# Patient Record
Sex: MALE | Race: WHITE | Employment: OTHER | URBAN - METROPOLITAN AREA
[De-identification: names, ages, dates, MRNs, and addresses within clinical notes are randomized per-mention and may not be internally consistent; named-entity substitution may affect disease eponyms.]

---

## 2017-01-29 ENCOUNTER — APPOINTMENT (OUTPATIENT)
Dept: CT IMAGING | Age: 82
End: 2017-01-29
Attending: EMERGENCY MEDICINE
Payer: MEDICARE

## 2017-01-29 ENCOUNTER — HOSPITAL ENCOUNTER (EMERGENCY)
Age: 82
Discharge: HOME | End: 2017-01-29
Attending: EMERGENCY MEDICINE
Payer: MEDICARE

## 2017-01-29 VITALS
HEART RATE: 99 BPM | WEIGHT: 177.69 LBS | SYSTOLIC BLOOD PRESSURE: 140 MMHG | BODY MASS INDEX: 25.44 KG/M2 | TEMPERATURE: 98.2 F | OXYGEN SATURATION: 95 % | HEIGHT: 70 IN | RESPIRATION RATE: 17 BRPM | DIASTOLIC BLOOD PRESSURE: 88 MMHG

## 2017-01-29 DIAGNOSIS — R51.9 NONINTRACTABLE EPISODIC HEADACHE, UNSPECIFIED HEADACHE TYPE: Primary | ICD-10-CM

## 2017-01-29 PROCEDURE — 99282 EMERGENCY DEPT VISIT SF MDM: CPT

## 2017-01-29 PROCEDURE — 70450 CT HEAD/BRAIN W/O DYE: CPT

## 2017-01-29 RX ORDER — BUTALBITAL, ACETAMINOPHEN AND CAFFEINE 300; 40; 50 MG/1; MG/1; MG/1
1 CAPSULE ORAL
Qty: 20 CAP | Refills: 0 | Status: SHIPPED | OUTPATIENT
Start: 2017-01-29 | End: 2019-03-19

## 2017-01-29 NOTE — ED TRIAGE NOTES
Patient presents ambulatory to treatment area with a steady gait. 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 He was told that the tumor has grown at the time of his last MRI. Patient had no symptoms at the time and did not wish to have the tumor removed at that time. Last night, he developed a headache that is focused in the anterior area of his head. The pain persists today.

## 2017-01-29 NOTE — DISCHARGE INSTRUCTIONS
Headache: Care Instructions  Your Care Instructions    Headaches have many possible causes. Most headaches aren't a sign of a more serious problem, and they will get better on their own. Home treatment may help you feel better faster. The doctor has checked you carefully, but problems can develop later. If you notice any problems or new symptoms, get medical treatment right away. Follow-up care is a key part of your treatment and safety. Be sure to make and go to all appointments, and call your doctor if you are having problems. It's also a good idea to know your test results and keep a list of the medicines you take. How can you care for yourself at home? · Do not drive if you have taken a prescription pain medicine. · Rest in a quiet, dark room until your headache is gone. Close your eyes and try to relax or go to sleep. Don't watch TV or read. · Put a cold, moist cloth or cold pack on the painful area for 10 to 20 minutes at a time. Put a thin cloth between the cold pack and your skin. · Use a warm, moist towel or a heating pad set on low to relax tight shoulder and neck muscles. · Have someone gently massage your neck and shoulders. · Take pain medicines exactly as directed. ¨ If the doctor gave you a prescription medicine for pain, take it as prescribed. ¨ If you are not taking a prescription pain medicine, ask your doctor if you can take an over-the-counter medicine. · Be careful not to take pain medicine more often than the instructions allow, because you may get worse or more frequent headaches when the medicine wears off. · Do not ignore new symptoms that occur with a headache, such as a fever, weakness or numbness, vision changes, or confusion. These may be signs of a more serious problem. To prevent headaches  · Keep a headache diary so you can figure out what triggers your headaches. Avoiding triggers may help you prevent headaches.  Record when each headache began, how long it lasted, and what the pain was like (throbbing, aching, stabbing, or dull). Write down any other symptoms you had with the headache, such as nausea, flashing lights or dark spots, or sensitivity to bright light or loud noise. Note if the headache occurred near your period. List anything that might have triggered the headache, such as certain foods (chocolate, cheese, wine) or odors, smoke, bright light, stress, or lack of sleep. · Find healthy ways to deal with stress. Headaches are most common during or right after stressful times. Take time to relax before and after you do something that has caused a headache in the past.  · Try to keep your muscles relaxed by keeping good posture. Check your jaw, face, neck, and shoulder muscles for tension, and try relaxing them. When sitting at a desk, change positions often, and stretch for 30 seconds each hour. · Get plenty of sleep and exercise. · Eat regularly and well. Long periods without food can trigger a headache. · Treat yourself to a massage. Some people find that regular massages are very helpful in relieving tension. · Limit caffeine by not drinking too much coffee, tea, or soda. But don't quit caffeine suddenly, because that can also give you headaches. · Reduce eyestrain from computers by blinking frequently and looking away from the computer screen every so often. Make sure you have proper eyewear and that your monitor is set up properly, about an arm's length away. · Seek help if you have depression or anxiety. Your headaches may be linked to these conditions. Treatment can both prevent headaches and help with symptoms of anxiety or depression. When should you call for help? Call 911 anytime you think you may need emergency care. For example, call if:  · You have signs of a stroke. These may include:  ¨ Sudden numbness, paralysis, or weakness in your face, arm, or leg, especially on only one side of your body. ¨ Sudden vision changes.   ¨ Sudden trouble speaking. ¨ Sudden confusion or trouble understanding simple statements. ¨ Sudden problems with walking or balance. ¨ A sudden, severe headache that is different from past headaches. Call your doctor now or seek immediate medical care if:  · You have a new or worse headache. · Your headache gets much worse. Where can you learn more? Go to http://tenzin-henrry.info/. Enter M271 in the search box to learn more about \"Headache: Care Instructions. \"  Current as of: February 19, 2016  Content Version: 11.1  © 1777-2246 [employer]. Care instructions adapted under license by [employer] (which disclaims liability or warranty for this information). If you have questions about a medical condition or this instruction, always ask your healthcare professional. [address] any warranty or liability for your use of this information. We hope that we have addressed all of your medical concerns. The examination and treatment you received in the Emergency Department were for an emergent problem and were not intended as complete care. It is important that you follow up with your healthcare provider(s) for ongoing care. If your symptoms worsen or do not improve as expected, and you are unable to reach your usual health care provider(s), you should return to the Emergency Department. Today's healthcare is undergoing tremendous change, and patient satisfaction surveys are one of the many tools to assess the quality of medical care. You may receive a survey from the [employer] regarding your experience in the Emergency Department. I hope that your experience has been completely positive, particularly the medical care that I provided. As such, please participate in the survey; anything less than excellent does not meet my expectations or intentions.         [address] [facility] and [address] [place] [address] participate in nationally recognized quality of care measures. If your blood pressure is greater than 120/80, as reported below, we urge that you seek medical care to address the potential of high blood pressure, commonly known as hypertension. Hypertension can be hereditary or can be caused by certain medical conditions, pain, stress, or \"white coat syndrome. \"       Please make an appointment with your health care provider(s) for follow up of your Emergency Department visit. VITALS:   Patient Vitals for the past 8 hrs:   Temp Pulse Resp BP SpO2   01/29/17 1256 98.2 °F (36.8 °C) 99 17 140/88 95 %          Thank you for allowing us to provide you with medical care today. We realize that you have many choices for your emergency care needs. Please choose us in the future for any continued health care needs. Regards,           Manfred Cornejo, [address]: [phone]            No results found for this or any previous visit (from the past 24 hour(s)). Ct Head Wo Cont    Result Date: 1/29/2017  EXAM:  CT head without contrast INDICATION: Headache. History of meningioma. COMPARISON: MRI brain 10/5/2016. CT head 2/22/2016. TECHNIQUE: Axial noncontrast head CT from foramen magnum to vertex. Coronal and sagittal reformatted images were obtained. CT dose reduction was achieved through use of a standardized protocol tailored for this examination and automatic exposure control for dose modulation. Adaptive statistical iterative reconstruction (ASIR) was utilized. FINDINGS:  There is diffuse age-related parenchymal volume loss. The ventricles and sulci are age-appropriate without hydrocephalus. There is no intracranial hemorrhage or extra-axial fluid collection. There is a stable confluent area of low attenuation in the right frontal lobe white matter surrounding a known meningioma. There is stable mild mass effect in the left frontal lobe.  Additional scattered foci of low attenuation in the periventricular white matter represent stable chronic microvascular ischemic changes. There is no new abnormal parenchymal attenuation. The basal cisterns are patent. There is intracranial atherosclerosis. The osseous structures are intact. The visualized paranasal sinuses and mastoid air cells are clear. IMPRESSION: No acute intracranial abnormality. Grossly stable right frontal meningioma with surrounding vasogenic edema.

## 2017-01-29 NOTE — ED NOTES
The patient was discharged home by Dr. Clarence Martin in stable condition. The patient is alert and oriented, in no respiratory distress. The patient's diagnosis, condition and treatment were explained. The patient expressed understanding. Two prescriptions given. No work/school note given. A discharge plan has been developed. A  was not involved in the process. Aftercare instructions were given. Pt ambulatory out of the ED with spouse.

## 2017-01-29 NOTE — ED PROVIDER NOTES
Patient is a 80 y.o. male presenting with headaches. The history is provided by the patient. Headache    This is a new problem. The current episode started yesterday. The problem occurs constantly. The problem has not changed since onset. The headache is aggravated by nothing (recent uri symptoms). The pain is located in the frontal region. The quality of the pain is described as dull. Pertinent negatives include no fever, no palpitations, no shortness of breath, no weakness, no dizziness, no nausea and no vomiting. He has tried nothing for the symptoms. Past Medical History:   Diagnosis Date    Brain tumor ([address]      frontal; behind right eye. 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 at [facility] MAIN OR    Hx gi       inguinal hernia repairx 2    Pr abdomen surgery proc unlisted       hernia    Hx heent       nasal    Hx colonoscopy  2009         Family History:   Problem Relation Age of Onset    No Known Problems Mother     No Known Problems Father        Social History     Social History    Marital status:      Spouse name: N/A    Number of children: N/A    Years of education: N/A     Occupational History    Not on file.      Social History Main Topics    Smoking status: Former Smoker     Packs/day: 1.00     Years: 4.00     Quit date: 1/20/1960    Smokeless tobacco: Never Used    Alcohol use Yes      Comment: occasional/social    Drug use: No    Sexual activity: Not on file     Other Topics Concern    Not on file     Social History Narrative         ALLERGIES: Review of patient's allergies indicates no known allergies. Review of Systems   Constitutional: Negative. Negative for activity change, appetite change, chills, fatigue, fever and unexpected weight change. HENT: Negative for congestion, hearing loss, rhinorrhea, sneezing and voice change. Eyes: Negative. Negative for pain and visual disturbance. Respiratory: Negative. Negative for apnea, cough, choking, chest tightness and shortness of breath. Cardiovascular: Negative. Negative for chest pain and palpitations. Gastrointestinal: Negative. Negative for abdominal distention, abdominal pain, blood in stool, diarrhea, nausea and vomiting. Genitourinary: Negative. Negative for difficulty urinating, flank pain, frequency and urgency. No discharge   Musculoskeletal: Negative. Negative for arthralgias, back pain, myalgias and neck stiffness. Skin: Negative. Negative for color change and rash. Neurological: Positive for headaches. Negative for dizziness, seizures, syncope, speech difficulty, weakness and numbness. Hematological: Negative for adenopathy. Psychiatric/Behavioral: Negative. Negative for agitation, behavioral problems, dysphoric mood and suicidal ideas. The patient is not nervous/anxious. All other systems reviewed and are negative. Vitals:    01/29/17 1256   BP: 140/88   Pulse: 99   Resp: 17   Temp: 98.2 °F (36.8 °C)   SpO2: 95%   Weight: 80.6 kg (177 lb 11.1 oz)   Height: 5' 10\" (1.778 m)            Physical Exam   Constitutional: He is oriented to person, place, and time. He appears well-developed and well-nourished. No distress. HENT:   Head: Normocephalic and atraumatic. Mouth/Throat: Oropharynx is clear and moist. No oropharyngeal exudate. Eyes: Conjunctivae and EOM are normal. Pupils are equal, round, and reactive to light. Right eye exhibits no discharge. Left eye exhibits no discharge. Neck: Normal range of motion. Neck supple.    Cardiovascular: Normal rate, regular rhythm and intact distal pulses. Exam reveals no gallop and no friction rub. No murmur heard. Pulmonary/Chest: Effort normal and breath sounds normal. No respiratory distress. He has no wheezes. He has no rales. He exhibits no tenderness. Abdominal: Soft. Bowel sounds are normal. He exhibits no distension and no mass. There is no tenderness. There is no rebound and no guarding. Musculoskeletal: Normal range of motion. He exhibits no edema. Lymphadenopathy:     He has no cervical adenopathy. Neurological: He is alert and oriented to person, place, and time. No cranial nerve deficit. Coordination normal.   Skin: Skin is warm and dry. No rash noted. No erythema. Psychiatric: He has a normal mood and affect. Nursing note and vitals reviewed. MDM  Number of Diagnoses or Management Options  Diagnosis management comments: Patient with a known meningioma that is being followed by Dr. Juan Miguel Burns (neurosurgery) - currently not under treatment. Acuity of symptoms clinically more likely to be from mild uri. Ct shows no significant change in prior findings. Will d/c with fioricet, coricidin hbp. F/u pcp this week.        Amount and/or Complexity of Data Reviewed  Tests in the radiology section of CPT®: ordered and reviewed    Risk of Complications, Morbidity, and/or Mortality  Presenting problems: moderate  Diagnostic procedures: moderate  Management options: low    Patient Progress  Patient progress: stable    ED Course       Procedures

## 2017-04-24 ENCOUNTER — HOSPITAL ENCOUNTER (OUTPATIENT)
Dept: RADIATION THERAPY | Age: 82
Discharge: HOME | End: 2017-04-24

## 2017-04-26 ENCOUNTER — HOSPITAL ENCOUNTER (OUTPATIENT)
Dept: MRI IMAGING | Age: 82
Discharge: HOME | End: 2017-04-26
Attending: RADIOLOGY
Payer: MEDICARE

## 2017-04-26 VITALS — BODY MASS INDEX: 24.68 KG/M2 | WEIGHT: 172 LBS

## 2017-04-26 DIAGNOSIS — D32.9 BENIGN NEOPLASM OF MENINGES (HCC): ICD-10-CM

## 2017-04-26 LAB — CREAT BLD-MCNC: 1.1 MG/DL (ref 0.6–1.3)

## 2017-04-26 PROCEDURE — A9585 GADOBUTROL INJECTION: HCPCS | Performed by: RADIOLOGY

## 2017-04-26 PROCEDURE — 74011250636 HC RX REV CODE- 250/636: Performed by: RADIOLOGY

## 2017-04-26 PROCEDURE — 82565 ASSAY OF CREATININE: CPT

## 2017-04-26 PROCEDURE — 70553 MRI BRAIN STEM W/O & W/DYE: CPT

## 2017-04-26 RX ADMIN — GADOBUTROL 7 ML: 604.72 INJECTION INTRAVENOUS at 12:04

## 2017-07-11 ENCOUNTER — HOSPITAL ENCOUNTER (OUTPATIENT)
Dept: RADIATION THERAPY | Age: 82
Discharge: HOME | End: 2017-07-11

## 2018-05-30 ENCOUNTER — HOSPITAL ENCOUNTER (OUTPATIENT)
Dept: LAB | Age: 83
Discharge: HOME | End: 2018-05-30

## 2018-05-30 ENCOUNTER — OFFICE VISIT (OUTPATIENT)
Dept: DERMATOLOGY | Facility: AMBULATORY SURGERY CENTER | Age: 83
End: 2018-05-30

## 2018-05-30 VITALS
BODY MASS INDEX: 23.62 KG/M2 | WEIGHT: 165 LBS | OXYGEN SATURATION: 96 % | DIASTOLIC BLOOD PRESSURE: 80 MMHG | SYSTOLIC BLOOD PRESSURE: 140 MMHG | TEMPERATURE: 98.5 F | HEART RATE: 74 BPM | RESPIRATION RATE: 20 BRPM | HEIGHT: 70 IN

## 2018-05-30 DIAGNOSIS — L82.0 INFLAMED SEBORRHEIC KERATOSIS: ICD-10-CM

## 2018-05-30 DIAGNOSIS — D48.5 NEOPLASM OF UNCERTAIN BEHAVIOR OF SKIN OF CHIN: ICD-10-CM

## 2018-05-30 DIAGNOSIS — L82.1 SEBORRHEIC KERATOSES: ICD-10-CM

## 2018-05-30 DIAGNOSIS — Z85.828 HISTORY OF NONMELANOMA SKIN CANCER: ICD-10-CM

## 2018-05-30 DIAGNOSIS — L57.0 ACTINIC KERATOSES: Primary | ICD-10-CM

## 2018-05-30 RX ORDER — FLUOROURACIL 50 MG/G
CREAM TOPICAL 2 TIMES DAILY
Qty: 40 G | Refills: 0 | Status: SHIPPED | OUTPATIENT
Start: 2018-05-30

## 2018-05-30 RX ORDER — FLUOROURACIL 5 MG/G
CREAM TOPICAL DAILY
COMMUNITY
End: 2018-05-30

## 2018-05-30 NOTE — MR AVS SNAPSHOT
[address] [facility] [address] [place] [address] [address] [facility] 
[phone] Patient: Stephania Vega MRN: [id number] DPI:[id number] Visit Information Date & Time Provider Department Dept. Phone Encounter #  
 5/30/2018  2:45 PM RIGO Curiel57 [phone] [id number] Your Appointments 5/30/2018  2:45 PM  
Any with RIGO Curiel [id number] ) Appt Note: est.pt concerned about spot on nose [facility] [address] [facility] [address]  
[facility] [address] [place] [id number] Upcoming Health Maintenance Date Due DTaP/Tdap/Td series (1 - Tdap) 9/27/1952 ZOSTER VACCINE AGE 60> 7/27/1991 GLAUCOMA SCREENING Q2Y 9/27/1996 Pneumococcal 65+ High/Highest Risk (2 of 2 - PPSV23) 10/1/2012 MEDICARE YEARLY EXAM 3/14/2018 Influenza Age 5 to Adult 8/1/2018 Allergies as of 5/30/2018  Review Complete On: 5/30/2018 By: Brad Joyce No Known Allergies Current Immunizations  Reviewed on 10/11/2010 Name Date Influenza Vaccine Split 10/12/2010  2:21 PM  
 ZZZ-RETIRED (DO NOT USE) Pneumococcal Vaccine (Unspecified Type) 10/1/2007 Not reviewed this visit Vitals BP Pulse Temp Resp Height(growth percentile) Weight(growth percentile) 140/80 (BP 1 Location: Right arm, BP Patient Position: Sitting) 74 98.5 °F (36.9 °C) (Oral) 20 5' 10\" (1.778 m) 165 lb (74.8 kg) SpO2 BMI Smoking Status 96% 23.68 kg/m2 Former Smoker Vitals History BMI and BSA Data Body Mass Index Body Surface Area  
 23.68 kg/m 2 1.92 m 2 Preferred Pharmacy Pharmacy Name Phone [address] [facility] [address] [place] Your Updated Medication List  
  
   
 This list is accurate as of 5/30/18  1:07 PM.  Always use your most recent med list.  
  
  
  
  
 albuterol 90 mcg/actuation inhaler Commonly known as:  PROVENTIL HFA, VENTOLIN HFA, PROAIR HFA Take 2 Puffs by inhalation every four (4) hours as needed for Wheezing. Use with Spacer  
  
 aspirin 81 mg tablet Take 81 mg by mouth daily. Stopped 01/20/11 BREO ELLIPTA IN Take 1 Puff by inhalation. butalbital-acetaminophen-caff -40 mg per capsule Commonly known as:  [employer] Take 1 Cap by mouth every four (4) hours as needed for Pain. Max Daily Amount: 6 Caps. dextromethorphan-guaiFENesin  mg Cap Commonly known as:  CORICIDIN HBP Take 1 Cap by mouth two (2) times daily as needed. fluoruracil 0.5 % topical cream  
Commonly known as:  Maryann Amass Apply  to affected area daily. Apply to lesions as directed  
  
 fluticasone 50 mcg/actuation nasal spray Commonly known as:  Allan Calk 2 Sprays by Both Nostrils route daily. garlic 9,715 mg Cap Take 1,000 mg by mouth daily. inhalational spacing device 1 Each by Does Not Apply route as needed. lisinopril 40 mg tablet Commonly known as:  Fara Siva Take 40 mg by mouth daily. lovastatin 20 mg tablet Commonly known as:  MEVACOR Take 20 mg by mouth nightly. multivitamin tablet Commonly known as:  ONE A DAY Take 1 Tab by mouth daily. POTASSIUM CHLORIDE  
by Does Not Apply route. pravastatin 40 mg tablet Commonly known as:  PRAVACHOL Take 40 mg by mouth nightly. triamterene-hydroCHLOROthiazide 37.5-25 mg per capsule Commonly known as:  Paulo Ditch Take 1 Cap by mouth every morning. VITAMIN D3 1,000 unit tablet Generic drug:  cholecalciferol Take 1,000 Units by mouth daily. Patient Instructions Self Skin Exam and Sunscreens Early detection and treatment is essential in the treatment of all forms of skin cancer. If caught early, all forms of skin cancer are curable. In addition to your regular visits, you should perform a monthly skin examination. Over time, you become familiar with what is normally found on your skin and can identify new or suspicious spots. One of the screening tools you can use to assess your skin is to follow the ABCDEs: 
 
A= Asymmetry (One half is unlike the other half) B= Border (An irregular, scalloped or poorly defined edge) C= Color (Is varied from one area to another, has shades of tan, brown/ black,       white, red or blue) D= Diameter (Spots larger than 6mm or a pencil eraser) E= Evolving (New spots or one that is changing in size, shape, or color) A follow- up interval will be customized based on your history of skin cancer or level of skin damage and risk factors. In any case, if you notice a suspicious or new spot, an appointment should be arranged between regular visits. Everyone should use sunscreen and sun-safe practices, which is especially important for those with a personal or family history of skin cancer. Suggestions for this include: 1. Use daily moisturizers containing SPF 30 or higher. 2. Wear long sleeve clothing with UPF ratings and a broad-brimmed hat. 3. Apply sunscreen with SPF 30 or higher to all sun exposed areas if you are going to be in the sun. A broad spectrum UVA/ UVB sunscreen is best.  Dont forget to REAPPLY every two hours or more often if swimming or sweating! 4. Avoid outside activities during peak sun hours, especially in the summer (10am- 2pm). 5. DO NOT use tanning beds. Using sunscreen and sun-safe practices can help reduce the likelihood of developing skin cancer or additional skin cancers in those previously diagnosed. [place] Introducing [facility]!    
 Kendra Cardenas introduces [facility] patient portal. Now you can access parts of your medical record, email your doctor's office, and request medication refills online. 1. In your internet browser, go to [email or web address] 2. Click on the First Time User? Click Here link in the Sign In box. You will see the New Member Sign Up page. 3. Enter your Ule Access Code exactly as it appears below. You will not need to use this code after youve completed the sign-up process. If you do not sign up before the expiration date, you must request a new code. · Ule Access Code: [email or web address] Expires: 8/28/2018  1:07 PM 
 
4. Enter the last four digits of your Social Security Number (xxxx) and Date of Birth (mm/dd/yyyy) as indicated and click Submit. You will be taken to the next sign-up page. 5. Create a Ule ID. This will be your Ule login ID and cannot be changed, so think of one that is secure and easy to remember. 6. Create a Ule password. You can change your password at any time. 7. Enter your Password Reset Question and Answer. This can be used at a later time if you forget your password. 8. Enter your e-mail address. You will receive e-mail notification when new information is available in [address] 9. Click Sign Up. You can now view and download portions of your medical record. 10. Click the Download Summary menu link to download a portable copy of your medical information. If you have questions, please visit the Frequently Asked Questions section of the Ule website. Remember, Ule is NOT to be used for urgent needs. For medical emergencies, dial 911. Now available from your iPhone and Android! Please provide this summary of care documentation to your next provider. Your primary care clinician is listed as Jie Feng. If you have any questions after today's visit, please call [phone].

## 2018-05-30 NOTE — PROGRESS NOTES
Written by Sheila Sheffield, as dictated by Ronn Irizarry, [address]. Name: Thom Smith       Age: 80 y.o. Date: 5/30/2018    Chief Complaint:   Chief Complaint   Patient presents with    Skin Exam     spot on nose    Medication Refill     carac cream       Subjective:    HPI:  Mr.. Thom Smith is a 80 y.o. male who presents for the evaluation of a lesion on the right alar rim. He states that the lesion appeared weeks ago. The patient has had prior treatment of Carac for this lesion. He states this lesion has resolved. He would like a refill of his 5-Fu. He reports the right earlobe that is attached to his neck after skin cancer surgery bothers him. He states it is tender to touch. Significant history of NMSC with some needing post op radiation. ROS: Consitutional: Negative  Dermatological : positive for - skin lesion changes      Social History     Social History    Marital status:      Spouse name: N/A    Number of children: N/A    Years of education: N/A     Occupational History    Not on file. 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     Cancer ([address]     skin  basal cell    Family history of skin cancer     Hypertension     Other ill-defined conditions(799.89)     elevated cholesterol    Radiation exposure     Skin cancer     Sun-damaged skin        Past Surgical History:   Procedure Laterality Date    ABDOMEN SURGERY PROC UNLISTED      hernia    HX COLONOSCOPY 2009    HX GI      inguinal hernia repairx 2    HX HEENT      nasal    HX OTHER SURGICAL      many skin cancer procedures/nose,ear    REPAIR NASAL CAVITY STENOSIS  10/11/2010    NASAL RECONSTRUCTION performed by Jannette Cabrera. at [facility] MAIN OR       Current Outpatient Prescriptions   Medication Sig Dispense Refill    fluorouracil (EFUDEX) 5 % chemo cream Apply  to affected area two (2) times a day. 40 g 0    butalbital-acetaminophen-caff (FIORICET) -40 mg per capsule Take 1 Cap by mouth every four (4) hours as needed for Pain. Max Daily Amount: 6 Caps. 20 Cap 0    lisinopril (PRINIVIL, ZESTRIL) 40 mg tablet Take 40 mg by mouth daily.  lovastatin (MEVACOR) 20 mg tablet Take 20 mg by mouth nightly.  multivitamin (ONE A DAY) tablet Take 1 Tab by mouth daily.  pravastatin (PRAVACHOL) 40 mg tablet Take 40 mg by mouth nightly.  aspirin 81 mg tablet Take 81 mg by mouth daily. Stopped 33/88/97      garlic 6,661 mg Cap Take 1,000 mg by mouth daily.  cholecalciferol, vitamin d3, (VITAMIN D) 1,000 unit tablet Take 1,000 Units by mouth daily.  FLUTICASONE/VILANTEROL (BREO ELLIPTA IN) Take 1 Puff by inhalation.  dextromethorphan-guaiFENesin (CORICIDIN HBP)  mg cap Take 1 Cap by mouth two (2) times daily as needed. 20 Cap 0    albuterol (PROVENTIL HFA, VENTOLIN HFA, PROAIR HFA) 90 mcg/actuation inhaler Take 2 Puffs by inhalation every four (4) hours as needed for Wheezing. Use with Spacer 1 Inhaler 0    inhalational spacing device 1 Each by Does Not Apply route as needed. 1 Device 0    fluticasone (FLONASE) 50 mcg/actuation nasal spray 2 Sprays by Both Nostrils route daily. 1 Bottle 0    POTASSIUM CHLORIDE by Does Not Apply route.  triamterene-hydrochlorothiazide (DYAZIDE) 37.5-25 mg per capsule Take 1 Cap by mouth every morning.          No Known Allergies      Objective:    Visit Vitals    /80 (BP 1 Location: Right arm, BP Patient Position: Sitting)    Pulse 74    Temp 98.5 °F (36.9 °C) (Oral)    Resp 20    Ht 5' 10\" (1.778 m)    Wt 165 lb (74.8 kg)    SpO2 96%    BMI 23.68 kg/m2       Natalee San is a 80 y.o. male who appears well and in no distress. He is awake, alert, and oriented. There is no preauricular, submandibular, or cervical lymphadenopathy. A limited skin examination was completed including his face and ears, back and forearms. He has well healed surgical sites. There is fibrosis and tan skin change on the right neck from radiation history. He has tan skin change from radiation on the left forehead as well. He has diffuse thin scaled actinic keratoses on his nose, earlobes, and cheeks. He has a 6 x 5 mm pink shiny papule on his left chin concerning for basal cell carcinoma. He has scattered seborrheic keratoses on the back, face and forearms. Assessment/Plan:    1. Personal history of skin cancer. I discussed sun protection, sunscreen use, the warning signs of skin cancer, the need for self-skin examinations, and the need for regular practitioner exams every 6 months. The patient should follow up sooner as needed if new, changing, or symptomatic skin lesions arise. 2. Actinic Keratoses. The diagnosis of this precancerous lesion related to sun exposure was reviewed. I refilled Efudex. 3. Neoplasm of Uncertain Behavior, left chin. The differential diagnoses were discussed. A shave biopsy was advised to sample this lesion. The procedure was reviewed and verbal and written consent were obtained. The risks of pain, bleeding, infection, and scar were discussed. The patient is aware that this is a sample and is intended for diagnosis and not therapy of the skin lesion. I performed the procedure. The site was cleansed and anesthetized with 1% Lidocaine with Epinephrine 1:100,000. A shave biopsy was performed to sample the lesion. Drysol was used for hemostasis. The wound was bandaged and care reviewed.   The specimen was sent to pathology. I will contact the patient with the results and any further treatment that may be necessary. 4.Inflamed seborrheic keratoses. The diagnosis and treatment with liquid nitrogen cryotherapy were reviewed. The risk or persistence or recurrence of the keratosis and the potential for pigment change at the treated site were reviewed. Verbal consent was obtained. I treated 1 lesions with cryotherapy and care was reviewed. 5.Seborrheic keratoses. The diagnosis was reviewed and the patient was reassured that no treatment is needed for these benign lesions. This plan was reviewed with the patient and patient agrees. All questions were answered. This scribe documentation was reviewed by me and accurately reflects the examination and decisions made by me. [facility]   OFFICE PROCEDURE PROGRESS NOTE   Chart reviewed for the following:   IRonn, have reviewed the History, Physical and updated the Allergic reactions for [employer]. TIME OUT performed immediately prior to start of procedure:   INessa, have performed the following reviews on [employer]   prior to the start of the procedure:     * Patient was identified by name and date of birth   * Agreement on procedure being performed was verified   * Risks and Benefits explained to the patient   * Procedure site verified and marked as necessary   * Patient was positioned for comfort   * Consent was signed and verified     Time: 1:10 PM   Date of procedure: 5/30/2018  Procedure performed by: Sabina Garrett.  Jude Weston, [address]  Provider assisted by: Alexandra Rubalcava MA    Patient assisted by: self   How tolerated by patient: tolerated the procedure well with no complications   Comments: none

## 2018-05-30 NOTE — PROGRESS NOTES
Chief Complaint   Patient presents with    Skin Exam     1. Have you been to the ER, urgent care clinic since your last visit? Hospitalized since your last visit? No    2. Have you seen or consulted any other health care providers outside of the [address] [place] [address] since your last visit? Include any pap smears or colon screening.  No

## 2018-05-30 NOTE — PATIENT INSTRUCTIONS
Self Skin Exam and Sunscreens    Early detection and treatment is essential in the treatment of all forms of skin cancer. If caught early, all forms of skin cancer are curable. In addition to your regular visits, you should perform a monthly skin examination. Over time, you become familiar with what is normally found on your skin and can identify new or suspicious spots. One of the screening tools you can use to assess your skin is to follow the ABCDEs:    A= Asymmetry (One half is unlike the other half)     B= Border (An irregular, scalloped or poorly defined edge)    C= Color (Is varied from one area to another, has shades of tan, brown/ black,       white, red or blue)    D= Diameter (Spots larger than 6mm or a pencil eraser)    E= Evolving (New spots or one that is changing in size, shape, or color)    A follow- up interval will be customized based on your history of skin cancer or level of skin damage and risk factors. In any case, if you notice a suspicious or new spot, an appointment should be arranged between regular visits. Everyone should use sunscreen and sun-safe practices, which is especially important for those with a personal or family history of skin cancer. Suggestions for this include:    1. Use daily moisturizers containing SPF 30 or higher. 2. Wear long sleeve clothing with UPF ratings and a broad-brimmed hat. 3. Apply sunscreen with SPF 30 or higher to all sun exposed areas if you are going to be in the sun. A broad spectrum UVA/ UVB sunscreen is best.  Dont forget to REAPPLY every two hours or more often if swimming or sweating! 4. Avoid outside activities during peak sun hours, especially in the summer (10am- 2pm). 5. DO NOT use tanning beds. Using sunscreen and sun-safe practices can help reduce the likelihood of developing skin cancer or additional skin cancers in those previously diagnosed. [employer]

## 2018-06-05 NOTE — PROGRESS NOTES
I spoke with the patient and he is doing well. He understands the results and need for Mohs. He will take the open appt.  7/9 at 2pm.

## 2018-07-09 ENCOUNTER — OFFICE VISIT (OUTPATIENT)
Dept: DERMATOLOGY | Facility: AMBULATORY SURGERY CENTER | Age: 83
End: 2018-07-09

## 2018-07-09 VITALS
DIASTOLIC BLOOD PRESSURE: 86 MMHG | OXYGEN SATURATION: 98 % | SYSTOLIC BLOOD PRESSURE: 138 MMHG | HEIGHT: 70 IN | BODY MASS INDEX: 23.62 KG/M2 | RESPIRATION RATE: 18 BRPM | HEART RATE: 88 BPM | WEIGHT: 165 LBS

## 2018-07-09 DIAGNOSIS — L57.0 ACTINIC KERATOSIS: ICD-10-CM

## 2018-07-09 DIAGNOSIS — C44.319 BASAL CELL CARCINOMA OF CHIN: Primary | ICD-10-CM

## 2018-07-09 NOTE — MR AVS SNAPSHOT
[address] [facility] [address] 
[phone] Patient: Ellie Hyatt MRN: [id number] RBV:[id number] Visit Information Date & Time Provider Department Dept. Phone Encounter #  
 7/9/2018  2:00 PM MD Arabella Jane [id number]  Upcoming Health Maintenance Date Due DTaP/Tdap/Td series (1 - Tdap) 9/27/1952 ZOSTER VACCINE AGE 60> 7/27/1991 GLAUCOMA SCREENING Q2Y 9/27/1996 Pneumococcal 65+ High/Highest Risk (2 of 2 - PPSV23) 10/1/2012 MEDICARE YEARLY EXAM 3/14/2018 Influenza Age 5 to Adult 8/1/2018 Allergies as of 7/9/2018  Review Complete On: 7/9/2018 By: Darlyn Wall RN No Known Allergies Current Immunizations  Reviewed on 10/11/2010 Name Date Influenza Vaccine Split 10/12/2010  2:21 PM  
 ZZZ-RETIRED (DO NOT USE) Pneumococcal Vaccine (Unspecified Type) 10/1/2007 Not reviewed this visit You Were Diagnosed With   
  
 Codes Comments Basal cell carcinoma of chin    -  Primary ICD-10-CM: C44.319 ICD-9-CM: 173.31 Vitals BP Pulse Resp Height(growth percentile) Weight(growth percentile) SpO2  
 138/86 (BP 1 Location: Left arm, BP Patient Position: Sitting) 88 18 5' 10\" (1.778 m) 165 lb (74.8 kg) 98% BMI Smoking Status 23.68 kg/m2 Former Smoker BMI and BSA Data Body Mass Index Body Surface Area  
 23.68 kg/m 2 1.92 m 2 Preferred Pharmacy Pharmacy Name Phone [address] [address] [address] [place], [address] Your Updated Medication List  
  
   
This list is accurate as of 7/9/18  2:24 PM.  Always use your most recent med list.  
  
  
  
  
 albuterol 90 mcg/actuation inhaler Commonly known as:  PROVENTIL HFA, VENTOLIN HFA, PROAIR HFA Take 2 Puffs by inhalation every four (4) hours as needed for Wheezing. Use with Spacer aspirin 81 mg tablet Take 81 mg by mouth daily. Stopped 01/20/11 BREASHLEIGH ELLIPTA IN Take 1 Puff by inhalation. butalbital-acetaminophen-caff -40 mg per capsule Commonly known as:  [employer] Take 1 Cap by mouth every four (4) hours as needed for Pain. Max Daily Amount: 6 Caps. dextromethorphan-guaiFENesin  mg Cap Commonly known as:  CORICIDIN HBP Take 1 Cap by mouth two (2) times daily as needed. fluorouracil 5 % chemo cream  
Commonly known as:  EFUDEX Apply  to affected area two (2) times a day. fluticasone 50 mcg/actuation nasal spray Commonly known as:  Savanna Argueta 2 Sprays by Both Nostrils route daily. garlic 3,960 mg Cap Take 1,000 mg by mouth daily. inhalational spacing device 1 Each by Does Not Apply route as needed. lisinopril 40 mg tablet Commonly known as:  Thersia Kubas Take 40 mg by mouth daily. lovastatin 20 mg tablet Commonly known as:  MEVACOR Take 20 mg by mouth nightly. multivitamin tablet Commonly known as:  ONE A DAY Take 1 Tab by mouth daily. POTASSIUM CHLORIDE  
by Does Not Apply route. pravastatin 40 mg tablet Commonly known as:  PRAVACHOL Take 40 mg by mouth nightly. triamterene-hydroCHLOROthiazide 37.5-25 mg per capsule Commonly known as:  Dora Petit Take 1 Cap by mouth every morning. VITAMIN D3 1,000 unit tablet Generic drug:  cholecalciferol Take 1,000 Units by mouth daily. Patient Instructions WOUND CARE INSTRUCTIONS 1. Keep the dressing clean and dry and do not remove for 48 hours. 2. Then change the dressing once a day as follows: 
a. Wash hands before and after each dressing change. b. Remove dressing and wash site gently with mild soap and water, rinse, and pat dry. 
c. Apply an ointment (Bacitracin, Polysporin, Neosporin, Petroleum jelly or Aquaphor). d. Apply a non-stick (Telfa) dressing or Band-Aid to cover the wound. Remove pressure bandage on Wednesday, then wash gently and apply a thin layer of Vaseline and a band-aid to site daily for 1 week. 3. Watch for: BLEEDING: A small amount of drainage may occur. If bleeding occurs, elevate and rest the surgery site. Apply gauze and steady pressure for 15 minutes. If bleeding continues, call this office. INFECTION: Signs of infection include increased redness, pain, warmth, drainage of pus, and fever. If this occurs, call this office. 4. Special Instructions (follow any that are checked): 
· [x] You have stitches that DO NOT need to be removed. · [x] Avoid bending at the waist and heavy lifting for two days. · [x] Sleep with your head elevated for the next two nights. · [x] Rest the surgery site and keep it elevated as much as possible for two days. · [x] You may apply an ice-pack for 10-15 minutes every waking hour for the rest of the day. · [] Eat a soft diet and avoid hot food and hot drinks for the rest of the day. · [] Other instructions: Follow up as directed. Take Tylenol or Ibuprofen for pain as needed. Once the site is healed with no remaining bandages or open areas, protect your surgical site and scar from the sun, as this area will be more sensitive. Use a broad spectrum sunscreen SPF 30 or higher daily, and a chemical free product (one containing zinc oxide or titanium dioxide) is a good choice if the area is sensitive. You may begin to gently massage the surgical site in 2-3 weeks, rubbing in a circular motion along the scar. This can help reduce swelling and thickness of a scar. A scar cream may be used beginnning 1 month after the surgery. If you have any questions or concerns, please call our office Monday through Friday at [phone]. Introducing [facility]!    
 [employer] introduces [employer] patient portal. Now you can access parts of your medical record, email your doctor's office, and request medication refills online. 1. In your internet browser, go to [email or web address] 2. Click on the First Time User? Click Here link in the Sign In box. You will see the New Member Sign Up page. 3. Enter your [employer] Access Code exactly as it appears below. You will not need to use this code after youve completed the sign-up process. If you do not sign up before the expiration date, you must request a new code. · [employer] Access Code: [email or web address] Expires: 8/28/2018  1:07 PM 
 
4. Enter the last four digits of your Social Security Number (xxxx) and Date of Birth (mm/dd/yyyy) as indicated and click Submit. You will be taken to the next sign-up page. 5. Create a [employer] ID. This will be your [employer] login ID and cannot be changed, so think of one that is secure and easy to remember. 6. Create a [employer] password. You can change your password at any time. 7. Enter your Password Reset Question and Answer. This can be used at a later time if you forget your password. 8. Enter your e-mail address. You will receive e-mail notification when new information is available in [address] 9. Click Sign Up. You can now view and download portions of your medical record. 10. Click the Download Summary menu link to download a portable copy of your medical information. If you have questions, please visit the Frequently Asked Questions section of the [employer] website. Remember, [employer] is NOT to be used for urgent needs. For medical emergencies, dial 911. Now available from your iPhone and Android! Please provide this summary of care documentation to your next provider. Your primary care clinician is listed as Dalila Carballo. If you have any questions after today's visit, please call [phone].

## 2018-07-09 NOTE — PROGRESS NOTES
This note is written by [facility], as dictated by Armani Campos. Dahlia Goldmann, MD.    CC: Basal cell carcinoma on the left chin, actinic keratoses on the right dorsal hand    History of present illness:     Lukas Crook is a 80 y.o. male referred by Alexis Son, [address]. He has a biopsy-proven superficial and solid basal cell carcinoma on the left chin. This is a new basal cell carcinoma present for less than six months described as a lesion that concerned Alexis Son DNP with no prior treatment. Biopsy confirmed the diagnosis of basal cell carcinoma, and I reviewed the written pathology report. He has an unhealing scaly rough persistent lesion of concern on his right dorsal hand. He is feeling well and in his usual state of health today. He has no pain, no current illnesses, no other skin concerns. His allergies, medications, medical, and social history are reviewed by me today. Exam:     He is an awake, alert, and oriented 80 y.o. male who appears well and in no distress. There is no preauricular, submandibular, or cervical lymphadenopathy. I examined his left chin. He has a 9 x 5 mm indistinct pink shiny patch on his left chin. He confirms location. He has three thin-scaled patches consistent with actinic keratoses on his right dorsal hand. Assessment/plan:    1. Basal cell carcinoma, left chin. I discussed the diagnosis of basal cell carcinoma and summarized the pathology report. Mohs surgery is indicated by site, size, and poor definition. The procedure was discussed, verbal and written consent were obtained. I performed the procedure. Three stages were required to reach a tumor free plane. The surgical defect was managed with a complex repair. There were no complications. He will follow up as needed as the site heals. Indications, risks, and options were discussed with Lukas Crook preoperatively.  Risks including, but not limited to: pain, bleeding, infection, tumor recurrence, scarring and damage to motor and/or sensory nerves, were discussed. Yoana Hamilton chose Mohs surgery. Yoana Hamilton was an acceptable surgery candidate. Yoana Hamilton was placed in the appropriate position on the operating table in the Mohs surgery procedure room. The area was prepped and draped in the standard manner. Gentian violet was used to outline the clinical margins of the tumor. Local anesthesia was then obtained. The grossly visible tumor was then removed, an underlying layer was excised and mapped according to the Mohs technique, and the individual specimens examined microscopically. The process was repeated until microscopic examination of the tissue specimens confirmed a tumor-free plane. Hemostasis was obtained with electrosurgery and pressure. The wound was covered between stages with moist saline gauze. The wound management options of second intent healing, layered closure, local flap, and/or full thickness skin graft were discussed. Yoana Hamilton understands the aims, risks, alternatives, and possible complications and elects to proceed with a complex layered closure. Wound margins were made vertical, edges undermined in the muscular plane, standing cones corrected at both poles followed by layered closure. The wound was closed with buried 5-0 polysorb suture in the muscle and deep subcutis to reduce width of the wound, a second layer in the dermis to reduce tension on the skin edges, and skin edges were approximated with 6-0 fast gut suture. The final closure length was 32 mm. The wound was bandaged with Vaseline, Telfa, gauze and Coverroll. Wound care instructions (written and verbal) and a follow up appointment were given to Yoana Hamilton before discharge. Yoana Hamilton was discharged in good condition. 2. Actinic Keratoses, right dorsal hand. The diagnosis of this precancerous lesion related to sun exposure was reviewed.   Verbal consent was obtained. I treated 3 lesions with cryotherapy and post-cryotherapy care was reviewed. 3. History of nonmelanoma skin cancer. I discussed the diagnosis and recommend routine examinations with Chacha Avelar DNP for surveillance. The documentation recorded by the scribe accurately reflects the service I personally performed and the decisions made by me. [facility]   OFFICE PROCEDURE PROGRESS NOTE     Chart reviewed for the following:     Libia Woo MD, have reviewed the History, Physical and updated the Allergic reactions for [address] performed immediately prior to start of procedure:     Libia Reyes. August Woo MD, have performed the following reviews on Radha Ahuja prior to the start of the procedure:     * Patient was identified by name and date of birth   * Agreement on procedure being performed was verified   * Risks and Benefits explained to the patient   * Procedure site verified and marked as necessary   * Patient was positioned for comfort   * Consent was signed and verified     Time: 1:30 PM  Date of procedure: 7/9/2018  Procedure performed by: Yasmin De León.  August Woo MD   Provider assisted by: RN  Patient assisted by: self   How tolerated by patient: tolerated the procedure well with no complications   Comments: none

## 2018-07-09 NOTE — PATIENT INSTRUCTIONS
WOUND CARE INSTRUCTIONS    1. Keep the dressing clean and dry and do not remove for 48 hours. 2. Then change the dressing once a day as follows:  a. Wash hands before and after each dressing change. b. Remove dressing and wash site gently with mild soap and water, rinse, and pat dry.  c. Apply an ointment (Bacitracin, Polysporin, Neosporin, Petroleum jelly or Aquaphor). d. Apply a non-stick (Telfa) dressing or Band-Aid to cover the wound. Remove pressure bandage on Wednesday, then wash gently and apply a thin layer of Vaseline and a band-aid to site daily for 1 week. 3. Watch for:  BLEEDING: A small amount of drainage may occur. If bleeding occurs, elevate and rest the surgery site. Apply gauze and steady pressure for 15 minutes. If bleeding continues, call this office. INFECTION: Signs of infection include increased redness, pain, warmth, drainage of pus, and fever. If this occurs, call this office. 4. Special Instructions (follow any that are checked):  · [x] You have stitches that DO NOT need to be removed. · [x] Avoid bending at the waist and heavy lifting for two days. · [x] Sleep with your head elevated for the next two nights. · [x] Rest the surgery site and keep it elevated as much as possible for two days. · [x] You may apply an ice-pack for 10-15 minutes every waking hour for the rest of the day. · [] Eat a soft diet and avoid hot food and hot drinks for the rest of the day. · [] Other instructions: Follow up as directed. Take Tylenol or Ibuprofen for pain as needed. Once the site is healed with no remaining bandages or open areas, protect your surgical site and scar from the sun, as this area will be more sensitive. Use a broad spectrum sunscreen SPF 30 or higher daily, and a chemical free product (one containing zinc oxide or titanium dioxide) is a good choice if the area is sensitive.     You may begin to gently massage the surgical site in 2-3 weeks, rubbing in a circular motion along the scar. This can help reduce swelling and thickness of a scar. A scar cream may be used beginnning 1 month after the surgery. If you have any questions or concerns, please call our office Monday through Friday at [phone].

## 2018-12-27 ENCOUNTER — HOSPITAL ENCOUNTER (OUTPATIENT)
Dept: CT IMAGING | Age: 83
Discharge: HOME | End: 2018-12-27
Attending: OTOLARYNGOLOGY
Payer: MEDICARE

## 2018-12-27 DIAGNOSIS — R22.0 LOCALIZED SWELLING, MASS, AND LUMP OF HEAD: ICD-10-CM

## 2018-12-27 DIAGNOSIS — C44.300 MALIGNANT NEOPLASM OF SKIN OF FACE: ICD-10-CM

## 2018-12-27 LAB — CREAT BLD-MCNC: 1 MG/DL (ref 0.6–1.3)

## 2018-12-27 PROCEDURE — 74011636320 HC RX REV CODE- 636/320: Performed by: OTOLARYNGOLOGY

## 2018-12-27 PROCEDURE — 82565 ASSAY OF CREATININE: CPT

## 2018-12-27 PROCEDURE — 70491 CT SOFT TISSUE NECK W/DYE: CPT

## 2018-12-27 RX ADMIN — IOPAMIDOL 100 ML: 612 INJECTION, SOLUTION INTRAVENOUS at 12:58

## 2019-01-23 ENCOUNTER — HOSPITAL ENCOUNTER (OUTPATIENT)
Dept: ULTRASOUND IMAGING | Age: 84
Discharge: HOME | End: 2019-01-23
Attending: OTOLARYNGOLOGY
Payer: MEDICARE

## 2019-01-23 DIAGNOSIS — R22.0 LOCALIZED SWELLING, MASS, AND LUMP OF HEAD: ICD-10-CM

## 2019-01-23 DIAGNOSIS — C44.300 MALIGNANT NEOPLASM OF SKIN OF FACE: ICD-10-CM

## 2019-01-23 PROCEDURE — 74011250636 HC RX REV CODE- 250/636: Performed by: RADIOLOGY

## 2019-01-23 PROCEDURE — 42400 BIOPSY OF SALIVARY GLAND: CPT

## 2019-01-23 PROCEDURE — 88342 IMHCHEM/IMCYTCHM 1ST ANTB: CPT

## 2019-01-23 PROCEDURE — 88341 IMHCHEM/IMCYTCHM EA ADD ANTB: CPT

## 2019-01-23 PROCEDURE — 77030003503 HC NDL BIOP TISS BD -B

## 2019-01-23 PROCEDURE — 88305 TISSUE EXAM BY PATHOLOGIST: CPT

## 2019-01-23 PROCEDURE — 77030014115

## 2019-01-23 PROCEDURE — 88333 PATH CONSLTJ SURG CYTO XM 1: CPT

## 2019-01-23 RX ORDER — LIDOCAINE HYDROCHLORIDE 10 MG/ML
10 INJECTION, SOLUTION EPIDURAL; INFILTRATION; INTRACAUDAL; PERINEURAL
Status: COMPLETED | OUTPATIENT
Start: 2019-01-23 | End: 2019-01-23

## 2019-01-23 RX ADMIN — LIDOCAINE HYDROCHLORIDE 5 ML: 10 INJECTION, SOLUTION EPIDURAL; INFILTRATION; INTRACAUDAL; PERINEURAL at 09:24

## 2019-02-06 ENCOUNTER — HOSPITAL ENCOUNTER (EMERGENCY)
Age: 84
Discharge: HOME | End: 2019-02-06
Attending: EMERGENCY MEDICINE
Payer: MEDICARE

## 2019-02-06 VITALS
RESPIRATION RATE: 18 BRPM | HEIGHT: 69 IN | SYSTOLIC BLOOD PRESSURE: 192 MMHG | DIASTOLIC BLOOD PRESSURE: 97 MMHG | WEIGHT: 163 LBS | BODY MASS INDEX: 24.14 KG/M2 | OXYGEN SATURATION: 95 % | HEART RATE: 78 BPM | TEMPERATURE: 97.7 F

## 2019-02-06 DIAGNOSIS — K11.8 PAROTID MASS: Primary | ICD-10-CM

## 2019-02-06 PROCEDURE — 99283 EMERGENCY DEPT VISIT LOW MDM: CPT

## 2019-02-06 NOTE — PROGRESS NOTES
2/6/2019  
4:45 PM 
CM received TC from [facility], she contacted pt but felt they were skeptical about reason for call, CM contacted pt to discuss transport needs and explained the [facility] can assist w/ future needs for him, pt stated he and wife both drive and have no need for transport at this time. Sam Steinberg  1:48 PM 
ANGELO received callback from [facility]  [phone]  w/ [facility] she is able to service pt in [place]. and assist w/ transport needs, CM provided contact information, she will reach out to pt. Sam Steinberg  8:29 AM 
CM spoke w/ Dr. Aureliano Amanda re: transportation to  Dr. Lees ENT office in [place]. Pt has recent Dx parotid malignancy and has attempted 2X to see ENT, however has not been able to find the office, pt has no family locally to assist, lives in [place]. 
CM placed TC to PCP office, he has not been seen at the practice since 8/2018, Dr Sarah Hodges is no longer at the practice, the pt will need to choose a new PCP, there is no NN or ENT and no transportation resources available. CM placed TC to [employer] [phone] s/w Jimenez Randall who arranges transport their focus is [address] but she will check on [place] and see if they are able to assist pt. I will f/u w/ pt as I receive transport information ANGELO notified Dr. Aureliano Amanda who stated she got the pt an appt w/ Dr. Sapna Rockwell today at 11:00 AM in [place]. Sam Steinberg

## 2019-02-06 NOTE — DISCHARGE INSTRUCTIONS
We hope that we have addressed all of your medical concerns. The examination and treatment you received in the Emergency Department were for an emergent problem and were not intended as complete care. It is important that you follow up with your healthcare provider(s) for ongoing care. If your symptoms worsen or do not improve as expected, and you are unable to reach your usual health care provider(s), you should return to the Emergency Department. Today's healthcare is undergoing tremendous change, and patient satisfaction surveys are one of the many tools to assess the quality of medical care. You may receive a survey from the [employer] regarding your experience in the Emergency Department. I hope that your experience has been completely positive, particularly the medical care that I provided. As such, please participate in the survey; anything less than excellent does not meet my expectations or intentions. [facility] and [address] [place] [address] participate in nationally recognized quality of care measures. If your blood pressure is greater than 120/80, as reported below, we urge that you seek medical care to address the potential of high blood pressure, commonly known as hypertension. Hypertension can be hereditary or can be caused by certain medical conditions, pain, stress, or \"white coat syndrome. \"       Please make an appointment with your health care provider(s) for follow up of your Emergency Department visit. VITALS:   Patient Vitals for the past 8 hrs:   Temp Pulse Resp BP SpO2   02/06/19 0804 97.7 °F (36.5 °C) 78 18 (!) 192/97 95 %          Thank you for allowing us to provide you with medical care today. We realize that you have many choices for your emergency care needs. Please choose us in the future for any continued health care needs. Ihsan Giron, [address] [place] [address] [address] Office: [phone]            No results found for this or any previous visit (from the past 24 hour(s)). No results found.

## 2019-02-06 NOTE — ED NOTES
Pt discharged by Dr. Ronni Sanches and given discharge papers. Pt is discharged in no distress and acknowledges understanding of plan of care.

## 2019-02-06 NOTE — ED NOTES
Dr. Mckenzie Burton making extensive phone calls to ensure proper follow up care for patient. Spoke with Case Management, Dr. Lees, and Dr. Lamine Lu to ensure best plan for patient.

## 2019-02-06 NOTE — ED PROVIDER NOTES
HPI  
81 yo WM presents with right parotid mass. Pt is requesting help coordinating care. He was seen by ENT, Dr. Lees, had recent biopsy concerning for malignancy. Has f/u with Dr. Lees on Friday to discuss operative management but pt is concerned because Dr. Kenney Livings office is over 2 hours from his house. He has not outside assistance other than his elderly wife. Son in [place], [place]. Has seen Dr. Peter Maya once but requesting new referral to ENT due to location. Denies pain, fever, chills, trouble swallowing. No other complaints at this time. Past Medical History:  
Diagnosis Date  Brain tumor ([address] frontal; behind right eye.  Cancer ([address] skin  basal cell  Family history of skin cancer  Hypertension  Other ill-defined conditions(799.89)   
 elevated cholesterol  Radiation exposure  Skin cancer  Sun-damaged skin Past Surgical History:  
Procedure Laterality Date  ABDOMEN SURGERY PROC UNLISTED    
 hernia  HX COLONOSCOPY  2009  HX GI    
 inguinal hernia repairx 2  
 HX HEENT    
 nasal  
 HX MOHS PROCEDURES  07/09/2018 BCC L chin by Dr. Patricia Cook  HX OTHER SURGICAL    
 many skin cancer procedures/nose,ear  REPAIR NASAL CAVITY STENOSIS  10/11/2010 NASAL RECONSTRUCTION performed by Maria Dolores Guthrie at [facility] MAIN OR Family History:  
Problem Relation Age of Onset  No Known Problems Mother  No Known Problems Father Social History Socioeconomic History  Marital status:  Spouse name: Not on file  Number of children: Not on file  Years of education: Not on file  Highest education level: Not on file Social Needs  Financial resource strain: Not on file  Food insecurity - worry: Not on file  Food insecurity - inability: Not on file  Transportation needs - medical: Not on file  Transportation needs - non-medical: Not on file Occupational History  Not on file Tobacco Use  
  Smoking status: Former Smoker Packs/day: 1.00 Years: 4.00 Pack years: 4.00 Last attempt to quit: 1960 Years since quittin.0  Smokeless tobacco: Never Used Substance and Sexual Activity  Alcohol use: Yes Comment: occasional/social  
 Drug use: No  
 Sexual activity: Not on file Other Topics Concern  Not on file Social History Narrative  Not on file ALLERGIES: Patient has no known allergies. Review of Systems Constitutional: Negative for chills and fever. HENT: Positive for facial swelling. Respiratory: Negative for cough and shortness of breath. Cardiovascular: Negative for chest pain. Gastrointestinal: Negative for nausea and vomiting. Musculoskeletal: Negative for neck pain and neck stiffness. Skin: Negative for rash. All other systems reviewed and are negative. Vitals:  
 19 8182 19 3427 BP: (!) 192/97 Pulse: 78 Resp: 18 Temp: 97.7 °F (36.5 °C) SpO2: 95% 95% Weight: 73.9 kg (163 lb) Height: 5' 9\" (1.753 m) Physical Exam  
Physical Examination: General appearance - alert, well appearing, and in no distress, oriented to person, place, and time and normal appearing weight Eyes - pupils equal and reactive, extraocular eye movements intact HEENT-mass to right parotid gland, well healed scars to face and head Neck - supple, no significant adenopathy Chest - clear to auscultation, no wheezes, rales or rhonchi, symmetric air entry Heart - normal rate, regular rhythm, normal S1, S2, no murmurs, rubs, clicks or gallops Abdomen - soft, nontender, nondistended, no masses or organomegaly Back exam - full range of motion, no tenderness, palpable spasm or pain on motion Neurological - alert, oriented, normal speech, no focal findings or movement disorder noted Musculoskeletal - no joint tenderness, deformity or swelling Extremities - peripheral pulses normal, no pedal edema, no clubbing or cyanosis Skin - normal coloration and turgor, no rashes, no suspicious skin lesions noted MDM Number of Diagnoses or Management Options Parotid mass:  
  
Amount and/or Complexity of Data Reviewed Tests in the radiology section of CPT®: reviewed Decide to obtain previous medical records or to obtain history from someone other than the patient: yes Obtain history from someone other than the patient: yes (wife) Review and summarize past medical records: yes Patient Progress Patient progress: stable Procedures Discussed with Dr. Lees, will see pt in [place] office next Wednesday. Pt is adamant he doesn't want to f/u with Dr. Lees due to location of office. Would like to see a physician at [facility]. Given number for Dr. Purvi García, also discussed with Dr. Purvi García. Pt has no family (other than elderly wife) in town to help coordinate his care. Called case management to seek assistance.

## 2019-03-19 RX ORDER — AMLODIPINE BESYLATE 5 MG/1
5 TABLET ORAL DAILY
COMMUNITY

## 2019-03-19 NOTE — PERIOP NOTES
Dr. Maryam Rivera office called and spoke with [facility] regarding need for potassium level to be drawn day of surgery. Patient stated his potassium level was low when he saw his PCP in February. Patient stated they would recheck his potassium level DOS. [facility] will give message to Dr. Lees or Dr. Maryam Rivera nurse and they will call PreAdmission back with instructions on if he wants a potassium level drawn DOS.

## 2019-03-19 NOTE — PERIOP NOTES
PHONE INTERVIEW COMPLETED WITH PATIENT. PREOP INSTRUCTIONS REVIEWED AND DISCUSSED. MEDICATIONS REVIEWED WITH PATIENT AND INSTRUCTIONS GIVEN ON WHICH MEDICATIONS TO HOLD OR TAKE PRIOR TO SURGERY,  AND DAY OF SURGERY. OPPORTUNITY GIVEN FOR PATIENT TO ASK QUESTIONS.

## 2019-03-22 ENCOUNTER — ANESTHESIA EVENT (OUTPATIENT)
Dept: SURGERY | Age: 84
End: 2019-03-22
Payer: MEDICARE

## 2019-03-22 RX ORDER — ONDANSETRON 2 MG/ML
4 INJECTION INTRAMUSCULAR; INTRAVENOUS AS NEEDED
Status: CANCELLED | OUTPATIENT
Start: 2019-03-22

## 2019-03-22 RX ORDER — ALBUTEROL SULFATE 0.83 MG/ML
2.5 SOLUTION RESPIRATORY (INHALATION) AS NEEDED
Status: CANCELLED | OUTPATIENT
Start: 2019-03-22

## 2019-03-22 RX ORDER — SODIUM CHLORIDE 9 MG/ML
25 INJECTION, SOLUTION INTRAVENOUS CONTINUOUS
Status: CANCELLED | OUTPATIENT
Start: 2019-03-22

## 2019-03-22 RX ORDER — MIDAZOLAM HYDROCHLORIDE 1 MG/ML
0.5 INJECTION, SOLUTION INTRAMUSCULAR; INTRAVENOUS
Status: CANCELLED | OUTPATIENT
Start: 2019-03-22

## 2019-03-22 RX ORDER — DIPHENHYDRAMINE HYDROCHLORIDE 50 MG/ML
12.5 INJECTION, SOLUTION INTRAMUSCULAR; INTRAVENOUS AS NEEDED
Status: CANCELLED | OUTPATIENT
Start: 2019-03-22 | End: 2019-03-22

## 2019-03-22 RX ORDER — FENTANYL CITRATE 50 UG/ML
25 INJECTION, SOLUTION INTRAMUSCULAR; INTRAVENOUS
Status: CANCELLED | OUTPATIENT
Start: 2019-03-22

## 2019-03-22 RX ORDER — HYDROCODONE BITARTRATE AND ACETAMINOPHEN 5; 325 MG/1; MG/1
1 TABLET ORAL AS NEEDED
Status: CANCELLED | OUTPATIENT
Start: 2019-03-22

## 2019-03-22 RX ORDER — MORPHINE SULFATE 10 MG/ML
2 INJECTION, SOLUTION INTRAMUSCULAR; INTRAVENOUS
Status: CANCELLED | OUTPATIENT
Start: 2019-03-22

## 2019-03-22 RX ORDER — SODIUM CHLORIDE, SODIUM LACTATE, POTASSIUM CHLORIDE, CALCIUM CHLORIDE 600; 310; 30; 20 MG/100ML; MG/100ML; MG/100ML; MG/100ML
1000 INJECTION, SOLUTION INTRAVENOUS CONTINUOUS
Status: CANCELLED | OUTPATIENT
Start: 2019-03-22

## 2019-03-22 RX ORDER — HYDROMORPHONE HYDROCHLORIDE 1 MG/ML
0.2 INJECTION, SOLUTION INTRAMUSCULAR; INTRAVENOUS; SUBCUTANEOUS
Status: CANCELLED | OUTPATIENT
Start: 2019-03-22

## 2019-03-22 RX ORDER — EPHEDRINE SULFATE 50 MG/ML
5 INJECTION, SOLUTION INTRAVENOUS AS NEEDED
Status: CANCELLED | OUTPATIENT
Start: 2019-03-22

## 2019-03-25 ENCOUNTER — HOSPITAL ENCOUNTER (OUTPATIENT)
Age: 84
Discharge: HOME | End: 2019-03-25
Attending: OTOLARYNGOLOGY | Admitting: OTOLARYNGOLOGY
Payer: MEDICARE

## 2019-03-25 ENCOUNTER — ANESTHESIA (OUTPATIENT)
Dept: SURGERY | Age: 84
End: 2019-03-25
Payer: MEDICARE

## 2019-03-25 VITALS
BODY MASS INDEX: 24.04 KG/M2 | WEIGHT: 162.31 LBS | SYSTOLIC BLOOD PRESSURE: 163 MMHG | DIASTOLIC BLOOD PRESSURE: 83 MMHG | TEMPERATURE: 99.4 F | OXYGEN SATURATION: 96 % | RESPIRATION RATE: 18 BRPM | HEIGHT: 69 IN | HEART RATE: 67 BPM

## 2019-03-25 LAB — HGB BLD-MCNC: 14.9 G/DL (ref 12.1–17)

## 2019-03-25 PROCEDURE — 74011250636 HC RX REV CODE- 250/636

## 2019-03-25 PROCEDURE — 77030020782 HC GWN BAIR PAWS FLX 3M -B

## 2019-03-25 PROCEDURE — 85018 HEMOGLOBIN: CPT

## 2019-03-25 RX ORDER — MIDAZOLAM HYDROCHLORIDE 1 MG/ML
1 INJECTION, SOLUTION INTRAMUSCULAR; INTRAVENOUS AS NEEDED
Status: DISCONTINUED | OUTPATIENT
Start: 2019-03-25 | End: 2019-03-25 | Stop reason: HOSPADM

## 2019-03-25 RX ORDER — SODIUM CHLORIDE 9 MG/ML
25 INJECTION, SOLUTION INTRAVENOUS CONTINUOUS
Status: DISCONTINUED | OUTPATIENT
Start: 2019-03-25 | End: 2019-03-25 | Stop reason: HOSPADM

## 2019-03-25 RX ORDER — ROPIVACAINE HYDROCHLORIDE 5 MG/ML
30 INJECTION, SOLUTION EPIDURAL; INFILTRATION; PERINEURAL AS NEEDED
Status: DISCONTINUED | OUTPATIENT
Start: 2019-03-25 | End: 2019-03-25 | Stop reason: HOSPADM

## 2019-03-25 RX ORDER — GLYCOPYRROLATE 0.2 MG/ML
0.2 INJECTION INTRAMUSCULAR; INTRAVENOUS
Status: DISCONTINUED | OUTPATIENT
Start: 2019-03-25 | End: 2019-03-25 | Stop reason: HOSPADM

## 2019-03-25 RX ORDER — FENTANYL CITRATE 50 UG/ML
50 INJECTION, SOLUTION INTRAMUSCULAR; INTRAVENOUS AS NEEDED
Status: DISCONTINUED | OUTPATIENT
Start: 2019-03-25 | End: 2019-03-25 | Stop reason: HOSPADM

## 2019-03-25 RX ORDER — SODIUM CHLORIDE, SODIUM LACTATE, POTASSIUM CHLORIDE, CALCIUM CHLORIDE 600; 310; 30; 20 MG/100ML; MG/100ML; MG/100ML; MG/100ML
1000 INJECTION, SOLUTION INTRAVENOUS CONTINUOUS
Status: DISCONTINUED | OUTPATIENT
Start: 2019-03-25 | End: 2019-03-25 | Stop reason: HOSPADM

## 2019-03-25 RX ORDER — LIDOCAINE HYDROCHLORIDE 10 MG/ML
0.1 INJECTION, SOLUTION EPIDURAL; INFILTRATION; INTRACAUDAL; PERINEURAL AS NEEDED
Status: DISCONTINUED | OUTPATIENT
Start: 2019-03-25 | End: 2019-03-25 | Stop reason: HOSPADM

## 2019-03-25 NOTE — PERIOP NOTES
1: 50 p.m. Called into OR number 3, due to patient feeling anxious about surgery waiting time. Per nurse in room possible 3pm. Called surgical waiting area to have wife come down and sit with patient. Patient is updated on delay. 2:28 pm numerous times explaining the wait for patient. Patient and wife wanted to know \"Now\" called into OR 3 again to notify surgeon and OR nurse. Dr. Lees is okay with patient cancelling surgery and rescheduling with office. Patient stated, \"tell Dr. Lees I want a referral to another surgeon\". Patient and wife very upset and decided to leave (cancell case). 2:33 patient walked out of Preop holding with wife, and all of belongings he came in with.

## 2019-03-25 NOTE — ANESTHESIA PREPROCEDURE EVALUATION
Relevant Problems No relevant active problems Anesthetic History No history of anesthetic complications Review of Systems / Medical History Patient summary reviewed, nursing notes reviewed and pertinent labs reviewed Pulmonary Within defined limits Neuro/Psych Within defined limits Cardiovascular Hypertension Exercise tolerance: >4 METS Comments: LBBB old LVEF 45-50% GI/Hepatic/Renal 
Within defined limits Endo/Other Within defined limits Other Findings Physical Exam 
 
Airway Mallampati: II 
TM Distance: > 6 cm Neck ROM: normal range of motion Mouth opening: Normal 
 
 Cardiovascular Regular rate and rhythm,  S1 and S2 normal,  no murmur, click, rub, or gallop Dental 
No notable dental hx Pulmonary Breath sounds clear to auscultation Abdominal 
GI exam deferred Other Findings Anesthetic Plan ASA: 3 Anesthesia type: general 
 
 
 
 
Induction: Intravenous Anesthetic plan and risks discussed with: Patient

## 2019-03-29 ENCOUNTER — HOSPITAL ENCOUNTER (EMERGENCY)
Age: 84
Discharge: HOME | End: 2019-03-29
Attending: EMERGENCY MEDICINE
Payer: MEDICARE

## 2019-03-29 VITALS
RESPIRATION RATE: 14 BRPM | WEIGHT: 175.04 LBS | HEART RATE: 77 BPM | TEMPERATURE: 98.4 F | BODY MASS INDEX: 25.85 KG/M2 | SYSTOLIC BLOOD PRESSURE: 166 MMHG | DIASTOLIC BLOOD PRESSURE: 86 MMHG | OXYGEN SATURATION: 95 %

## 2019-03-29 DIAGNOSIS — H10.11 ACUTE ATOPIC CONJUNCTIVITIS OF RIGHT EYE: ICD-10-CM

## 2019-03-29 DIAGNOSIS — H57.9 SENSATION OF FOREIGN BODY IN EYE: Primary | ICD-10-CM

## 2019-03-29 PROCEDURE — 99283 EMERGENCY DEPT VISIT LOW MDM: CPT

## 2019-03-29 PROCEDURE — 74011000250 HC RX REV CODE- 250: Performed by: EMERGENCY MEDICINE

## 2019-03-29 RX ORDER — TETRACAINE HYDROCHLORIDE 5 MG/ML
1 SOLUTION OPHTHALMIC
Status: COMPLETED | OUTPATIENT
Start: 2019-03-29 | End: 2019-03-29

## 2019-03-29 RX ADMIN — TETRACAINE HYDROCHLORIDE 1 DROP: 5 SOLUTION OPHTHALMIC at 11:39

## 2019-03-29 RX ADMIN — FLUORESCEIN SODIUM 1 STRIP: 0.6 STRIP OPHTHALMIC at 11:40

## 2019-03-29 NOTE — ED TRIAGE NOTES
Pt presents to ED with c/o pain in right eye. Pt was using a circular saw about one week ago and felt something in his eye. There is no drainage from the eye except tears. There is erythema noted.

## 2019-03-30 NOTE — ED PROVIDER NOTES
The history is provided by the patient. Eye Pain This is a new problem. Episode onset: 1 week ago. The problem occurs constantly. The problem has been gradually worsening. The right eye is affected. The injury mechanism was a foreign body (sawdust while using a saw 1 week ago). The pain is mild. Associated symptoms include foreign body sensation, eye redness, itching and pain. Pertinent negatives include no fever. Past Medical History:  
Diagnosis Date  Brain tumor ([address] frontal; behind right eye.  Cancer ([address] BCC SEVERAL AREAS - MOSTLY HEAD AND FACE  Family history of skin cancer  Hypertension  Other ill-defined conditions(799.89)   
 elevated cholesterol  Radiation exposure  Sun-damaged skin Past Surgical History:  
Procedure Laterality Date [address] UNLISTED  [address], 2009 HERNIA X2  
 HX COLONOSCOPY  2009  HX GI    
 inguinal hernia repairx 2  
 HX HEENT  2009 NASAL RECONSTRUCTION DUE TO BCC  HX MOHS PROCEDURES  07/09/2018 BCC L chin by Dr. Arcelia Payne  HX OTHER SURGICAL    
 many skin cancer procedures/nose,ear  REPAIR NASAL CAVITY STENOSIS  10/11/2010 NASAL RECONSTRUCTION performed by Jim [facility]-Baptiste. at [facility] MAIN OR Family History:  
Problem Relation Age of Onset  Heart Failure Mother  Cancer Father PENILE  Kidney Disease Sister  Cancer Brother LUNG  
 No Known Problems Sister  Anesth Problems Neg Hx Social History Socioeconomic History  Marital status:  Spouse name: Not on file  Number of children: Not on file  Years of education: Not on file  Highest education level: Not on file Occupational History  Not on file Social Needs  Financial resource strain: Not on file  Food insecurity:  
  Worry: Not on file Inability: Not on file  Transportation needs:  
  Medical: Not on file Non-medical: Not on file Tobacco Use  
  Smoking status: Former Smoker Packs/day: 1.00 Years: 4.00 Pack years: 4.00 Last attempt to quit: 1960 Years since quittin.2  Smokeless tobacco: Never Used Substance and Sexual Activity  Alcohol use: No  
  Frequency: Never  Drug use: No  
 Sexual activity: Not on file Lifestyle  Physical activity:  
  Days per week: Not on file Minutes per session: Not on file  Stress: Not on file Relationships  Social connections:  
  Talks on phone: Not on file Gets together: Not on file Attends Voodoo service: Not on file Active member of club or organization: Not on file Attends meetings of clubs or organizations: Not on file Relationship status: Not on file  Intimate partner violence:  
  Fear of current or ex partner: Not on file Emotionally abused: Not on file Physically abused: Not on file Forced sexual activity: Not on file Other Topics Concern  Not on file Social History Narrative  Not on file ALLERGIES: Patient has no known allergies. Review of Systems Constitutional: Negative for fever. Eyes: Positive for pain and redness. Skin: Positive for itching. All other systems reviewed and are negative. Vitals:  
 19 1040 BP: 166/86 Pulse: 77 Resp: 14 Temp: 98.4 °F (36.9 °C) SpO2: 95% Weight: 79.4 kg (175 lb 0.7 oz) Physical Exam  
Constitutional: He appears well-developed and well-nourished. No distress. HENT:  
Head: Normocephalic and atraumatic. Eyes: Pupils are equal, round, and reactive to light. EOM are normal. Right eye exhibits chemosis (minimal lower lid). No foreign body present in the right eye. Right conjunctiva is injected. Left conjunctiva is not injected. Neck: Neck supple. No tracheal deviation present. Cardiovascular: Normal rate, regular rhythm and normal heart sounds.   
Pulmonary/Chest: Effort normal and breath sounds normal. No respiratory distress. Abdominal: He exhibits no distension. Musculoskeletal: Normal range of motion. He exhibits no deformity. Neurological: He is alert. No cranial nerve deficit. Skin: Skin is warm and dry. Psychiatric: His behavior is normal.  
Nursing note and vitals reviewed. MDM 
  
80 y.o. male presents with fb sensation of right eye. No fluorescein uptake, abrasion or ulcer. Not a contact lens wearer. No crusting, discharge or other infection symptoms. Appears to have an atopic conjunctivitis that has developed from initial sawdust insult. I inverted the lid and swept without identifying a remaining foreign body. Discussed antihistamine drops to help with inflammation and discomfort. F/u outpatient with ophthalmology prn. Procedures

## 2019-04-04 ENCOUNTER — ANESTHESIA (OUTPATIENT)
Dept: SURGERY | Age: 84
DRG: 828 | End: 2019-04-04
Payer: MEDICARE

## 2019-04-04 ENCOUNTER — HOSPITAL ENCOUNTER (INPATIENT)
Age: 84
LOS: 2 days | Discharge: HOME | DRG: 828 | End: 2019-04-06
Attending: OTOLARYNGOLOGY | Admitting: OTOLARYNGOLOGY
Payer: MEDICARE

## 2019-04-04 ENCOUNTER — ANESTHESIA EVENT (OUTPATIENT)
Dept: SURGERY | Age: 84
DRG: 828 | End: 2019-04-04
Payer: MEDICARE

## 2019-04-04 DIAGNOSIS — C79.89 SECONDARY MALIGNANT NEOPLASM OF PAROTID GLAND (HCC): Primary | ICD-10-CM

## 2019-04-04 PROCEDURE — 76010000176 HC OR TIME 4.5 TO 5 HR INTENSV-TIER 1: Performed by: OTOLARYNGOLOGY

## 2019-04-04 PROCEDURE — 76210000017 HC OR PH I REC 1.5 TO 2 HR: Performed by: OTOLARYNGOLOGY

## 2019-04-04 PROCEDURE — 88305 TISSUE EXAM BY PATHOLOGIST: CPT

## 2019-04-04 PROCEDURE — 74011250637 HC RX REV CODE- 250/637: Performed by: OTOLARYNGOLOGY

## 2019-04-04 PROCEDURE — 77030013079 HC BLNKT BAIR HGGR 3M -A: Performed by: NURSE ANESTHETIST, CERTIFIED REGISTERED

## 2019-04-04 PROCEDURE — 77030040361 HC SLV COMPR DVT MDII -B: Performed by: OTOLARYNGOLOGY

## 2019-04-04 PROCEDURE — P9045 ALBUMIN (HUMAN), 5%, 250 ML: HCPCS

## 2019-04-04 PROCEDURE — 07T10ZZ RESECTION OF RIGHT NECK LYMPHATIC, OPEN APPROACH: ICD-10-PCS | Performed by: OTOLARYNGOLOGY

## 2019-04-04 PROCEDURE — 77030010514 HC APPL CLP LIG COVD -B: Performed by: OTOLARYNGOLOGY

## 2019-04-04 PROCEDURE — 77030014008 HC SPNG HEMSTAT J&J -C: Performed by: OTOLARYNGOLOGY

## 2019-04-04 PROCEDURE — 09B0XZZ EXCISION OF RIGHT EXTERNAL EAR, EXTERNAL APPROACH: ICD-10-PCS | Performed by: OTOLARYNGOLOGY

## 2019-04-04 PROCEDURE — 0CTB0ZZ RESECTION OF RIGHT PAROTID DUCT, OPEN APPROACH: ICD-10-PCS | Performed by: OTOLARYNGOLOGY

## 2019-04-04 PROCEDURE — 77030019615 HC ELCTRD EMG NDL MEDT -B: Performed by: OTOLARYNGOLOGY

## 2019-04-04 PROCEDURE — 77030027138 HC INCENT SPIROMETER -A

## 2019-04-04 PROCEDURE — 88307 TISSUE EXAM BY PATHOLOGIST: CPT

## 2019-04-04 PROCEDURE — 77030008467 HC STPLR SKN COVD -B: Performed by: OTOLARYNGOLOGY

## 2019-04-04 PROCEDURE — 77030002916 HC SUT ETHLN J&J -A: Performed by: OTOLARYNGOLOGY

## 2019-04-04 PROCEDURE — 77030019655 HC PRB STIM CRAN MEDT -B: Performed by: OTOLARYNGOLOGY

## 2019-04-04 PROCEDURE — 74011000250 HC RX REV CODE- 250: Performed by: OTOLARYNGOLOGY

## 2019-04-04 PROCEDURE — 76060000040 HC ANESTHESIA 4.5 TO 5 HR: Performed by: OTOLARYNGOLOGY

## 2019-04-04 PROCEDURE — 77030013567 HC DRN WND RESERV BARD -A: Performed by: OTOLARYNGOLOGY

## 2019-04-04 PROCEDURE — 77030040356 HC CORD BPLR FRCP COVD -A: Performed by: OTOLARYNGOLOGY

## 2019-04-04 PROCEDURE — 65270000032 HC RM SEMIPRIVATE

## 2019-04-04 PROCEDURE — 74011000250 HC RX REV CODE- 250

## 2019-04-04 PROCEDURE — 77030008684 HC TU ET CUF COVD -B: Performed by: NURSE ANESTHETIST, CERTIFIED REGISTERED

## 2019-04-04 PROCEDURE — 77030018836 HC SOL IRR NACL ICUM -A: Performed by: OTOLARYNGOLOGY

## 2019-04-04 PROCEDURE — 88331 PATH CONSLTJ SURG 1 BLK 1SPC: CPT

## 2019-04-04 PROCEDURE — 74011250636 HC RX REV CODE- 250/636

## 2019-04-04 PROCEDURE — 77030026438 HC STYL ET INTUB CARD -A: Performed by: NURSE ANESTHETIST, CERTIFIED REGISTERED

## 2019-04-04 PROCEDURE — 77030011640 HC PAD GRND REM COVD -A: Performed by: OTOLARYNGOLOGY

## 2019-04-04 PROCEDURE — 77030011267 HC ELECTRD BLD COVD -A: Performed by: OTOLARYNGOLOGY

## 2019-04-04 PROCEDURE — 77030035230: Performed by: OTOLARYNGOLOGY

## 2019-04-04 PROCEDURE — 77030003029 HC SUT VCRL J&J -B: Performed by: OTOLARYNGOLOGY

## 2019-04-04 PROCEDURE — 77030012407 HC DRN WND BARD -B: Performed by: OTOLARYNGOLOGY

## 2019-04-04 PROCEDURE — 77030002996 HC SUT SLK J&J -A: Performed by: OTOLARYNGOLOGY

## 2019-04-04 PROCEDURE — 88309 TISSUE EXAM BY PATHOLOGIST: CPT

## 2019-04-04 PROCEDURE — 77030002933 HC SUT MCRYL J&J -A: Performed by: OTOLARYNGOLOGY

## 2019-04-04 RX ORDER — AMLODIPINE BESYLATE 5 MG/1
5 TABLET ORAL DAILY
Status: DISCONTINUED | OUTPATIENT
Start: 2019-04-05 | End: 2019-04-06 | Stop reason: HOSPADM

## 2019-04-04 RX ORDER — ONDANSETRON 4 MG/1
4 TABLET, ORALLY DISINTEGRATING ORAL
Qty: 8 TAB | Refills: 1 | Status: SHIPPED | OUTPATIENT
Start: 2019-04-04 | End: 2020-04-12

## 2019-04-04 RX ORDER — DEXAMETHASONE SODIUM PHOSPHATE 4 MG/ML
INJECTION, SOLUTION INTRA-ARTICULAR; INTRALESIONAL; INTRAMUSCULAR; INTRAVENOUS; SOFT TISSUE AS NEEDED
Status: DISCONTINUED | OUTPATIENT
Start: 2019-04-04 | End: 2019-04-04 | Stop reason: HOSPADM

## 2019-04-04 RX ORDER — LOVASTATIN 20 MG/1
20 TABLET ORAL DAILY
Status: DISCONTINUED | OUTPATIENT
Start: 2019-04-05 | End: 2019-04-06 | Stop reason: HOSPADM

## 2019-04-04 RX ORDER — CEFAZOLIN SODIUM IN 0.9 % NACL 2 G/100 ML
PLASTIC BAG, INJECTION (ML) INTRAVENOUS AS NEEDED
Status: DISCONTINUED | OUTPATIENT
Start: 2019-04-04 | End: 2019-04-04 | Stop reason: HOSPADM

## 2019-04-04 RX ORDER — MORPHINE SULFATE 2 MG/ML
2 INJECTION, SOLUTION INTRAMUSCULAR; INTRAVENOUS
Status: DISCONTINUED | OUTPATIENT
Start: 2019-04-04 | End: 2019-04-06 | Stop reason: HOSPADM

## 2019-04-04 RX ORDER — ONDANSETRON 2 MG/ML
INJECTION INTRAMUSCULAR; INTRAVENOUS AS NEEDED
Status: DISCONTINUED | OUTPATIENT
Start: 2019-04-04 | End: 2019-04-04 | Stop reason: HOSPADM

## 2019-04-04 RX ORDER — HYDROMORPHONE HYDROCHLORIDE 2 MG/ML
INJECTION, SOLUTION INTRAMUSCULAR; INTRAVENOUS; SUBCUTANEOUS AS NEEDED
Status: DISCONTINUED | OUTPATIENT
Start: 2019-04-04 | End: 2019-04-04 | Stop reason: HOSPADM

## 2019-04-04 RX ORDER — ACETAMINOPHEN 325 MG/1
650 TABLET ORAL
Status: DISCONTINUED | OUTPATIENT
Start: 2019-04-04 | End: 2019-04-06 | Stop reason: HOSPADM

## 2019-04-04 RX ORDER — HYDROCODONE BITARTRATE AND ACETAMINOPHEN 10; 325 MG/1; MG/1
1 TABLET ORAL
Status: DISCONTINUED | OUTPATIENT
Start: 2019-04-04 | End: 2019-04-06 | Stop reason: HOSPADM

## 2019-04-04 RX ORDER — HYDROCODONE BITARTRATE AND ACETAMINOPHEN 5; 325 MG/1; MG/1
1 TABLET ORAL
Qty: 30 TAB | Refills: 0 | Status: SHIPPED | OUTPATIENT
Start: 2019-04-04 | End: 2019-04-07

## 2019-04-04 RX ORDER — LISINOPRIL 20 MG/1
40 TABLET ORAL DAILY
Status: DISCONTINUED | OUTPATIENT
Start: 2019-04-05 | End: 2019-04-06 | Stop reason: HOSPADM

## 2019-04-04 RX ORDER — SUCCINYLCHOLINE CHLORIDE 20 MG/ML
INJECTION INTRAMUSCULAR; INTRAVENOUS AS NEEDED
Status: DISCONTINUED | OUTPATIENT
Start: 2019-04-04 | End: 2019-04-04 | Stop reason: HOSPADM

## 2019-04-04 RX ORDER — BACITRACIN 500 [USP'U]/G
OINTMENT TOPICAL AS NEEDED
Status: DISCONTINUED | OUTPATIENT
Start: 2019-04-04 | End: 2019-04-04 | Stop reason: HOSPADM

## 2019-04-04 RX ORDER — GLYCOPYRROLATE 0.2 MG/ML
INJECTION INTRAMUSCULAR; INTRAVENOUS AS NEEDED
Status: DISCONTINUED | OUTPATIENT
Start: 2019-04-04 | End: 2019-04-04 | Stop reason: HOSPADM

## 2019-04-04 RX ORDER — FENTANYL CITRATE 50 UG/ML
INJECTION, SOLUTION INTRAMUSCULAR; INTRAVENOUS AS NEEDED
Status: DISCONTINUED | OUTPATIENT
Start: 2019-04-04 | End: 2019-04-04 | Stop reason: HOSPADM

## 2019-04-04 RX ORDER — HYDROCODONE BITARTRATE AND ACETAMINOPHEN 5; 325 MG/1; MG/1
1 TABLET ORAL
Status: DISCONTINUED | OUTPATIENT
Start: 2019-04-04 | End: 2019-04-06 | Stop reason: HOSPADM

## 2019-04-04 RX ORDER — SODIUM CHLORIDE 0.9 % (FLUSH) 0.9 %
5-40 SYRINGE (ML) INJECTION AS NEEDED
Status: DISCONTINUED | OUTPATIENT
Start: 2019-04-04 | End: 2019-04-06 | Stop reason: HOSPADM

## 2019-04-04 RX ORDER — ROCURONIUM BROMIDE 10 MG/ML
INJECTION, SOLUTION INTRAVENOUS AS NEEDED
Status: DISCONTINUED | OUTPATIENT
Start: 2019-04-04 | End: 2019-04-04 | Stop reason: HOSPADM

## 2019-04-04 RX ORDER — ESMOLOL HYDROCHLORIDE 10 MG/ML
INJECTION INTRAVENOUS AS NEEDED
Status: DISCONTINUED | OUTPATIENT
Start: 2019-04-04 | End: 2019-04-04 | Stop reason: HOSPADM

## 2019-04-04 RX ORDER — SODIUM CHLORIDE, SODIUM LACTATE, POTASSIUM CHLORIDE, CALCIUM CHLORIDE 600; 310; 30; 20 MG/100ML; MG/100ML; MG/100ML; MG/100ML
INJECTION, SOLUTION INTRAVENOUS
Status: DISCONTINUED | OUTPATIENT
Start: 2019-04-04 | End: 2019-04-04 | Stop reason: HOSPADM

## 2019-04-04 RX ORDER — LIDOCAINE HYDROCHLORIDE AND EPINEPHRINE 10; 10 MG/ML; UG/ML
INJECTION, SOLUTION INFILTRATION; PERINEURAL AS NEEDED
Status: DISCONTINUED | OUTPATIENT
Start: 2019-04-04 | End: 2019-04-04 | Stop reason: HOSPADM

## 2019-04-04 RX ORDER — PHENYLEPHRINE HCL IN 0.9% NACL 0.4MG/10ML
SYRINGE (ML) INTRAVENOUS AS NEEDED
Status: DISCONTINUED | OUTPATIENT
Start: 2019-04-04 | End: 2019-04-04 | Stop reason: HOSPADM

## 2019-04-04 RX ORDER — ONDANSETRON 2 MG/ML
4 INJECTION INTRAMUSCULAR; INTRAVENOUS
Status: DISCONTINUED | OUTPATIENT
Start: 2019-04-04 | End: 2019-04-06 | Stop reason: HOSPADM

## 2019-04-04 RX ORDER — SODIUM CHLORIDE 0.9 % (FLUSH) 0.9 %
5-40 SYRINGE (ML) INJECTION EVERY 8 HOURS
Status: DISCONTINUED | OUTPATIENT
Start: 2019-04-04 | End: 2019-04-06 | Stop reason: HOSPADM

## 2019-04-04 RX ORDER — LIDOCAINE HYDROCHLORIDE 20 MG/ML
INJECTION, SOLUTION EPIDURAL; INFILTRATION; INTRACAUDAL; PERINEURAL AS NEEDED
Status: DISCONTINUED | OUTPATIENT
Start: 2019-04-04 | End: 2019-04-04 | Stop reason: HOSPADM

## 2019-04-04 RX ORDER — PROPOFOL 10 MG/ML
INJECTION, EMULSION INTRAVENOUS AS NEEDED
Status: DISCONTINUED | OUTPATIENT
Start: 2019-04-04 | End: 2019-04-04 | Stop reason: HOSPADM

## 2019-04-04 RX ORDER — POTASSIUM CHLORIDE 750 MG/1
10 TABLET, FILM COATED, EXTENDED RELEASE ORAL DAILY
Status: DISCONTINUED | OUTPATIENT
Start: 2019-04-05 | End: 2019-04-06 | Stop reason: HOSPADM

## 2019-04-04 RX ORDER — ALBUMIN HUMAN 50 G/1000ML
SOLUTION INTRAVENOUS AS NEEDED
Status: DISCONTINUED | OUTPATIENT
Start: 2019-04-04 | End: 2019-04-04 | Stop reason: HOSPADM

## 2019-04-04 RX ORDER — DEXMEDETOMIDINE HYDROCHLORIDE 4 UG/ML
INJECTION, SOLUTION INTRAVENOUS AS NEEDED
Status: DISCONTINUED | OUTPATIENT
Start: 2019-04-04 | End: 2019-04-04 | Stop reason: HOSPADM

## 2019-04-04 RX ORDER — EPHEDRINE SULFATE 50 MG/ML
INJECTION, SOLUTION INTRAVENOUS AS NEEDED
Status: DISCONTINUED | OUTPATIENT
Start: 2019-04-04 | End: 2019-04-04 | Stop reason: HOSPADM

## 2019-04-04 RX ADMIN — EPHEDRINE SULFATE 10 MG: 50 INJECTION, SOLUTION INTRAVENOUS at 09:27

## 2019-04-04 RX ADMIN — LIDOCAINE HYDROCHLORIDE 100 MG: 20 INJECTION, SOLUTION EPIDURAL; INFILTRATION; INTRACAUDAL; PERINEURAL at 09:16

## 2019-04-04 RX ADMIN — ONDANSETRON 4 MG: 2 INJECTION INTRAMUSCULAR; INTRAVENOUS at 13:18

## 2019-04-04 RX ADMIN — Medication 10 ML: at 22:08

## 2019-04-04 RX ADMIN — HYDROCODONE BITARTRATE AND ACETAMINOPHEN 1 TABLET: 10; 325 TABLET ORAL at 16:52

## 2019-04-04 RX ADMIN — FENTANYL CITRATE 50 MCG: 50 INJECTION, SOLUTION INTRAMUSCULAR; INTRAVENOUS at 10:55

## 2019-04-04 RX ADMIN — FENTANYL CITRATE 100 MCG: 50 INJECTION, SOLUTION INTRAMUSCULAR; INTRAVENOUS at 09:16

## 2019-04-04 RX ADMIN — DEXMEDETOMIDINE HYDROCHLORIDE 10 MCG: 4 INJECTION, SOLUTION INTRAVENOUS at 09:10

## 2019-04-04 RX ADMIN — EPHEDRINE SULFATE 10 MG: 50 INJECTION, SOLUTION INTRAVENOUS at 09:29

## 2019-04-04 RX ADMIN — ESMOLOL HYDROCHLORIDE 20 MG: 10 INJECTION INTRAVENOUS at 10:24

## 2019-04-04 RX ADMIN — SUCCINYLCHOLINE CHLORIDE 200 MG: 20 INJECTION INTRAMUSCULAR; INTRAVENOUS at 09:16

## 2019-04-04 RX ADMIN — ESMOLOL HYDROCHLORIDE 10 MG: 10 INJECTION INTRAVENOUS at 09:51

## 2019-04-04 RX ADMIN — ROCURONIUM BROMIDE 5 MG: 10 INJECTION, SOLUTION INTRAVENOUS at 09:16

## 2019-04-04 RX ADMIN — SODIUM CHLORIDE, SODIUM LACTATE, POTASSIUM CHLORIDE, CALCIUM CHLORIDE: 600; 310; 30; 20 INJECTION, SOLUTION INTRAVENOUS at 13:08

## 2019-04-04 RX ADMIN — SODIUM CHLORIDE, SODIUM LACTATE, POTASSIUM CHLORIDE, CALCIUM CHLORIDE: 600; 310; 30; 20 INJECTION, SOLUTION INTRAVENOUS at 09:10

## 2019-04-04 RX ADMIN — ESMOLOL HYDROCHLORIDE 10 MG: 10 INJECTION INTRAVENOUS at 10:53

## 2019-04-04 RX ADMIN — FENTANYL CITRATE 100 MCG: 50 INJECTION, SOLUTION INTRAMUSCULAR; INTRAVENOUS at 10:27

## 2019-04-04 RX ADMIN — Medication 40 MCG: at 11:17

## 2019-04-04 RX ADMIN — Medication 10 ML: at 15:21

## 2019-04-04 RX ADMIN — Medication 80 MCG: at 09:21

## 2019-04-04 RX ADMIN — GLYCOPYRROLATE 0.2 MG: 0.2 INJECTION INTRAMUSCULAR; INTRAVENOUS at 09:41

## 2019-04-04 RX ADMIN — FENTANYL CITRATE 50 MCG: 50 INJECTION, SOLUTION INTRAMUSCULAR; INTRAVENOUS at 09:45

## 2019-04-04 RX ADMIN — ALBUMIN HUMAN 250 ML: 50 SOLUTION INTRAVENOUS at 10:00

## 2019-04-04 RX ADMIN — HYDROMORPHONE HYDROCHLORIDE 0.2 MG: 2 INJECTION, SOLUTION INTRAMUSCULAR; INTRAVENOUS; SUBCUTANEOUS at 13:39

## 2019-04-04 RX ADMIN — PROPOFOL 100 MG: 10 INJECTION, EMULSION INTRAVENOUS at 09:16

## 2019-04-04 RX ADMIN — Medication 2 G: at 09:27

## 2019-04-04 RX ADMIN — DEXAMETHASONE SODIUM PHOSPHATE 8 MG: 4 INJECTION, SOLUTION INTRA-ARTICULAR; INTRALESIONAL; INTRAMUSCULAR; INTRAVENOUS; SOFT TISSUE at 09:50

## 2019-04-04 RX ADMIN — HYDROMORPHONE HYDROCHLORIDE 0.2 MG: 2 INJECTION, SOLUTION INTRAMUSCULAR; INTRAVENOUS; SUBCUTANEOUS at 12:19

## 2019-04-04 NOTE — ANESTHESIA POSTPROCEDURE EVALUATION
Post-Anesthesia Evaluation and Assessment Patient: Sam Damico MRN: [id number]  SSN: [id number] YOB: 1931  Age: 80 y.o. Sex: male I have evaluated the patient and they are stable and ready for discharge from the PACU. Cardiovascular Function/Vital Signs Visit Vitals /67 Pulse 86 Temp 36.7 °C (98.1 °F) Resp 14 Ht 5' 9\" (1.753 m) Wt 79.4 kg (175 lb 0.7 oz) SpO2 99% BMI 25.85 kg/m² Patient is status post General anesthesia for Procedure(s): RIGHT NECK DISSECTION; Right parotidectomy; and partial auriculectomy. Nausea/Vomiting: None Postoperative hydration reviewed and adequate. Pain: 
Pain Scale 1: Numeric (0 - 10) (04/04/19 0740) Pain Intensity 1: 0 (04/04/19 0740) Managed Neurological Status:  
Neuro (WDL): Exceptions to WDL (04/04/19 1400) Neuro Neurologic State: Anesthetized (04/04/19 1400) At baseline Mental Status, Level of Consciousness: Alert and  oriented to person, place, and time Pulmonary Status:  
O2 Device: CO2 nasal cannula (04/04/19 1404) Adequate oxygenation and airway patent Complications related to anesthesia: None Post-anesthesia assessment completed. No concerns Signed By: Melissa Schreiber MD   
 April 4, 2019 Procedure(s): RIGHT NECK DISSECTION; Right parotidectomy; and partial auriculectomy. general 
 
<BSHSIANPOST> Vitals Value Taken Time /73 4/4/2019  2:15 PM  
Temp 36.7 °C (98.1 °F) 4/4/2019  2:04 PM  
Pulse 81 4/4/2019  2:23 PM  
Resp 13 4/4/2019  2:23 PM  
SpO2 98 % 4/4/2019  2:23 PM  
Vitals shown include unvalidated device data.

## 2019-04-04 NOTE — BRIEF OP NOTE
BRIEF OPERATIVE NOTE Date of Procedure: 4/4/2019 Preoperative Diagnosis: MALIGNANT NEOPLASM SKIN OF FACE Postoperative Diagnosis: MALIGNANT NEOPLASM SKIN OF FACE Procedure(s): RIGHT NECK DISSECTION; Right total parotidectomy with nerve dissection; partial auriculectomy, advancement flap closure of neck/ear defect 20 cm2 Surgeon(s) and Role: * Ankush Feliciano MD - Primary * Paco Armstrong MD - Assisting Surgical Staff: 
Circ-1: Burns Link Circ-Relief: Ruthy Cruz RN; Ki Deng RN Scrub RN-1: Oscar Bullock RN Scrub RN-Relief: Ruthy Cruz RN Surg Asst-1: Mechelle Johnson RN Event Time In Time Out Incision Start 7831 Incision Close 1336 Anesthesia: General  
Estimated Blood Loss: 100ml Specimens:  
ID Type Source Tests Collected by Time Destination 1 : Right angle mandible Frozen Section Mandible  May, Garlin Fothergill, MD 4/4/2019 1059 Pathology 2 : Tissue at right stylomastoid foramen Frozen Section Mastoid  May, Garlin Fothergill, MD 4/4/2019 1100 Pathology 3 : Right parotidectomy and partial auriculectomy Fresh Ear, Right  MayRayshawn IV, MD 4/4/2019 1115 Pathology 4 : Posterior scalp margin Frozen Section Scalp  May, Garlin Fothergill, MD 4/4/2019 1134 Pathology 5 : Posterior Ear Margin Frozen Section Ear, Right  May, Garlin Fothergill, MD 4/4/2019 1135 Pathology 6 : Inferior ear margin Frozen Section Ear, Right  May, Garlin Fothergill, MD 4/4/2019 1135 Pathology 7 : Anterior facial skin margin Frozen Section Face  Ankush Feliciano MD 4/4/2019 1137 Pathology 8 : Inferior neck margin Frozen Section Neck  Ankush Feliciano MD 4/4/2019 1138 Pathology 9 : Deep lobe parotid Fresh Neck  May, Garlin Fothergill, MD 4/4/2019 1158 Pathology 10 : Paroximal lower division facial nerve Fresh Neck  May, Garlin Fothergill, MD 4/4/2019 1158 Pathology 11 : right neck dissection level 2 Fresh Neck  May, Garlin Fothergill, MD 4/4/2019 1234 Pathology 12 : right neck dissection level 3 Fresh Neck  Sheila Lees MD 4/4/2019 1235 Pathology 13 : right neck dissection level 4 Fresh Neck  MaySheila MD 4/4/2019 1235 Pathology 14 : right auricular cartilage margin Fresh Neck  Mir Chambres MD 4/4/2019 1244 Pathology Findings: tumor involving lower division of facial nerve Complications: none Implants: * No implants in log *

## 2019-04-04 NOTE — ANESTHESIA PREPROCEDURE EVALUATION
Relevant Problems No relevant active problems Anesthetic History No history of anesthetic complications Review of Systems / Medical History Patient summary reviewed, nursing notes reviewed and pertinent labs reviewed Pulmonary Within defined limits Neuro/Psych Within defined limits Cardiovascular Hypertension: well controlled GI/Hepatic/Renal 
Within defined limits Endo/Other Within defined limits Other Findings Physical Exam 
 
Airway Mallampati: II 
TM Distance: > 6 cm Neck ROM: normal range of motion Mouth opening: Normal 
 
 Cardiovascular Regular rate and rhythm,  S1 and S2 normal,  no murmur, click, rub, or gallop Dental 
No notable dental hx Pulmonary Breath sounds clear to auscultation Abdominal 
GI exam deferred Other Findings Anesthetic Plan ASA: 3 Anesthesia type: general 
 
 
 
 
Induction: Intravenous Anesthetic plan and risks discussed with: Patient

## 2019-04-04 NOTE — PERIOP NOTES
9:54 AM 
SPOKE TO PATIENT'S WIFE (MIMI STONE) TO UPDATE HER ON PATIENT'S SURGICAL STATUS 
 
12:08 PM 
SPOKE TO PATIENT'S WIFE (MIMI STONE) TO UPDATE HER ON PATIENT'S SURGICAL STATUS

## 2019-04-04 NOTE — ROUTINE PROCESS
Patient: Mat Viera MRN: [id number]  SSN: [id number] YOB: 1931  Age: 80 y.o. Sex: male Patient is status post Procedure(s): RIGHT NECK DISSECTION; Right parotidectomy; and partial auriculectomy. Surgeon(s) and Role: * Christiano Whitehead MD - Primary * Anitra Armstrong MD - Assisting Local/Dose/Irrigation:  6 ML 1% LIDOCAINE WITH EPINEPHRINE WAS INJECTED INTO PATIENT'S RIGHT NECK Saline irrigation to sterile field. Jose Alfredo-Dorantes Drain 04/04/19 Right Neck (Active) Site Assessment Clean, dry, & intact 4/4/2019 12:40 PM  
Dressing Status Clean, dry, & intact 4/4/2019 12:40 PM  
Status Patent; Charged;Draining 4/4/2019 12:40 PM  
Drainage Color Sanguinous 4/4/2019 12:40 PM  
  
Airway - Endotracheal Tube 04/04/19 Oral (Active) RIGHT NECK Dressing/Packing:   STAPLES; BACITRACIN OINTMENT Splint/Cast:  ] Other:  PATEL REMOVED  
 
 
 
1:39 PM 
XEROFORM WAS ADDED TO DRESSING AROUND PATIENT'S RIGHT EAR

## 2019-04-04 NOTE — PERIOP NOTES
TRANSFER - OUT REPORT: 
 
Verbal report given to Zulema Madden on Mitchell Mendoza  being transferred to 5 for routine post - op Report consisted of patients Situation, Background, Assessment and  
Recommendations(SBAR). Time Pre op antibiotic [id number] Anesthesia Stop time: 1405 Beauchamp Present on Transfer to floor:no Order for Beauchamp on Chart:no Discharge Prescriptions with Chart:yes x 3 Information from the following report(s) SBAR, OR Summary, Intake/Output and MAR was reviewed with the receiving nurse. Opportunity for questions and clarification was provided. Is the patient on 02? YES 
     L/Min 2 Other Is the patient on a monitor? NO Is the nurse transporting with the patient? NO Surgical Waiting Area notified of patient's transfer from PACU? YES The following personal items collected during your admission accompanied patient upon transfer:  
Dental Appliance: Dental Appliances: None Vision:   
Hearing Aid:   
Jewelry: Jewelry: None Clothing: Clothing: Other (comment)(clothes bag to pacu) Other Valuables: Other Valuables: None Valuables sent to safe:

## 2019-04-04 NOTE — PERIOP NOTES
Dr. Lees is at bedside. He states if bulb of drain gets air in it - recharge it. States he put vaseline gauze around ear to try to seal the leak.

## 2019-04-04 NOTE — PERIOP NOTES
12:41 PM 
SURGICEL WAS GIVEN TO THE STERILE FIELD TO BE USED BY MD DURING PROCEDURE 
REF: [id number] LOT: [id number] EXP: 06/30/2022

## 2019-04-04 NOTE — H&P
Ears/Nose/Throat Consult Subjective:  
 
 
History of Present Illness:  
Patient is a 80 y.o. male who is being seen for neck mass. he  was admitted to the hosptial for [address]. Past Medical History:  
Diagnosis Date  Brain tumor ([address] frontal; behind right eye.  Cancer ([facility] BCC SEVERAL AREAS - MOSTLY HEAD AND FACE  Family history of skin cancer  Hypertension  Other ill-defined conditions(799.89)   
 elevated cholesterol  Radiation exposure  Sun-damaged skin Family History Problem Relation Age of Onset  Heart Failure Mother  Cancer Father PENILE  Kidney Disease Sister  Cancer Brother LUNG  
 No Known Problems Sister  Anesth Problems Neg Hx Social History Tobacco Use  Smoking status: Former Smoker Packs/day: 1.00 Years: 4.00 Pack years: 4.00 Last attempt to quit: 1960 Years since quittin.2  Smokeless tobacco: Never Used Substance Use Topics  Alcohol use: No  
  Frequency: Never Past Surgical History:  
Procedure Laterality Date  [address] [place] [address] UNLISTED  7719D,  HERNIA X2  
 HX COLONOSCOPY  2009  HX GI    
 inguinal hernia repairx 2  
 HX HEENT  2009 NASAL RECONSTRUCTION DUE TO BCC  HX MOHS PROCEDURES  2018 BCC L chin by Dr. Lillian Villarreal  HX OTHER SURGICAL    
 many skin cancer procedures/nose,ear  REPAIR NASAL CAVITY STENOSIS  10/11/2010 NASAL RECONSTRUCTION performed by Thor Bullock. at [facility] MAIN OR No current facility-administered medications for this encounter. No Known Allergies Review of Systems: 
Pertinent items are noted in the History of Present Illness. Objective:  
 
Patient Vitals for the past 8 hrs: 
 BP Temp Pulse Resp SpO2 Height Weight 19 0740 130/67 97.9 °F (36.6 °C) (!) 57 16 98 % 5' 9\" (1.753 m) 79.4 kg (175 lb 0.7 oz) Temp (24hrs), Av.9 °F (36.6 °C), Min:97.9 °F (36.6 °C), Max:97.9 °F (36.6 °C) No intake/output data recorded. Physical Exam:  
General 
General Appearance- Well nourished adult in no apparent distress who is alert and cooperative. HEENT Head: Normally developed without evidence of trauma or lesions. Face: 
Skin:  Normal color, texture, and turgor. There are no lesions of concern nor swelling or induration. Lips- normal. 
Facial Nerve- Bilateral- function is equal and symmetrical with no deficit. Ear: Auricle- Left- Normal development without sinus pit, cyst or any other lesion. Right- Normal development without sinus pit, cyst or any other lesion Auditory Canal (otoscopic examination)  Left- clean, without edema, discharge, or lesions. Right  clean, without edema, discharge, or lesions. Tympanic membrane:  Left- intact and mobile, without middle ear effusion, retraction, or sclerosis. Right- intact and mobile, without middle ear effusion, retraction or sclerosis. Mastoid- Left- No erythema, edema, tenderness, or protrusion of the auricle. Right- No erythema, edema, tenderness, or protrusion of the auricle. Nose: External Nose- Normally developed without lesion. Nasal Mucosa- moist and pink without erythema, edema, or lesions. Nasal septum- Caudally the septum is relatively straight without lesion. Turbinates- Bilateral- The turbinates are without hypertrophy, edema, or lesion. Sinuses-  All sinuses are nontender to percussion. Oral Cavity/OropharynxDentition- Normal dentition for age witho no evidence of discoloration, inflammation, or infection. Oral Mucosa: moist without erythema or lesions. Tongue- no lesions or palpable masses. Floor of mouth- soft without palpable masses or mucosal lesion. Palate and uvula- Palate is without cleft and the uvula is not bifid. Soft palate elevates symmetrically.  Tonsils- Bilateral -Normal in size without exudates or erythema. Salivary Ducts-  Ducts appear to be normal.  Throat: Pharynx- Normal mucosal lining without erythema or mass. Larynx: difficult to examine directly. Neck:-- Trachea- midline with normal laryngeal framework with no crepitus. Salivary Glands- normal to palpation without nodules or tenderness. Thyroid- normal to palpation without mass or irregularity. Lymph Nodes- right parotid mass, prior scar. Range of Motion- good in all directions, with good anterior-posterior and lateral flexion and rotation. Chest and Lung Exam: Normal respiratory effort with no wheezing, retractions, or rales. Cardiovascular: Regular rate and rhythm. Normal peripheral pulses without bruits. Neurologic exam: Alert and oriented to person, place, and time. Cranial Nerves II-XII intact bilaterally. Assessment:  
 
1) right parotid mass, likely malignant Hospital Problems  Date Reviewed: 7/9/2018 None Plan: To OR for excision, possible neck dissection, possible partial auriculectomy. Reviewed surgery, risks and recovery. Signed By: Breana Rangel MD   
 April 4, 2019

## 2019-04-05 PROCEDURE — 74011000250 HC RX REV CODE- 250: Performed by: OTOLARYNGOLOGY

## 2019-04-05 PROCEDURE — 65270000032 HC RM SEMIPRIVATE

## 2019-04-05 PROCEDURE — 74011250637 HC RX REV CODE- 250/637: Performed by: OTOLARYNGOLOGY

## 2019-04-05 RX ORDER — BACITRACIN 500 UNIT/G
1 PACKET (EA) TOPICAL 2 TIMES DAILY
Status: DISCONTINUED | OUTPATIENT
Start: 2019-04-05 | End: 2019-04-06 | Stop reason: HOSPADM

## 2019-04-05 RX ADMIN — Medication 10 ML: at 16:27

## 2019-04-05 RX ADMIN — POTASSIUM CHLORIDE 10 MEQ: 750 TABLET, EXTENDED RELEASE ORAL at 10:50

## 2019-04-05 RX ADMIN — LISINOPRIL 40 MG: 20 TABLET ORAL at 10:50

## 2019-04-05 RX ADMIN — ACETAMINOPHEN 650 MG: 325 TABLET ORAL at 21:28

## 2019-04-05 RX ADMIN — BACITRACIN 1 PACKET: 500 OINTMENT TOPICAL at 16:26

## 2019-04-05 RX ADMIN — MINERAL OIL AND WHITE PETROLATUM: 150; 830 OINTMENT OPHTHALMIC at 00:07

## 2019-04-05 RX ADMIN — LOVASTATIN 20 MG: 20 TABLET ORAL at 10:50

## 2019-04-05 RX ADMIN — Medication 10 ML: at 21:02

## 2019-04-05 RX ADMIN — AMLODIPINE BESYLATE 5 MG: 5 TABLET ORAL at 10:50

## 2019-04-05 NOTE — PROGRESS NOTES
NUTRITION Nutrition screening referral was triggered based on results obtained during nursing admission assessment for \"unsure wt loss. \"   
 
Pt admitted for excision, possible neck dissection, possible partial auriculectomy. The patient's chart was reviewed and nutrition assessment is not indicated at this time. No wt loss noted. Wt Readings from Last 10 Encounters:  
04/04/19 79.4 kg (175 lb 0.7 oz) 03/29/19 79.4 kg (175 lb 0.7 oz) 03/19/19 73.6 kg (162 lb 5 oz) 02/06/19 73.9 kg (163 lb) 07/09/18 74.8 kg (165 lb) 05/30/18 74.8 kg (165 lb) 04/26/17 78 kg (172 lb)  
01/29/17 80.6 kg (177 lb 11.1 oz) 12/23/16 81.1 kg (178 lb 12.7 oz) 06/02/16 78 kg (172 lb) Plan to see patient for rescreen as indicated. Thank you.   
 
Génesis Boyd RD

## 2019-04-05 NOTE — PROGRESS NOTES
Bedside shift change report given to [facility] (oncoming nurse) by MARY Banks (offgoing nurse). Report included the following information SBAR and Kardex.

## 2019-04-05 NOTE — PROGRESS NOTES
Care Management Interventions PCP Verified by CM: Yes 
Palliative Care Criteria Met (RRAT>21 & CHF Dx)?: No 
MyChart Signup: No 
Discharge Durable Medical Equipment: No 
Health Maintenance Reviewed: Yes Physical Therapy Consult: No 
Occupational Therapy Consult: No 
Speech Therapy Consult: No 
Current Support Network: Lives with Spouse, Own Home Confirm Follow Up Transport: Family Plan discussed with Pt/Family/Caregiver: Yes [employer] Information Provided?: No 
Discharge Location Discharge Placement: Home with family assistance Reason for Admission:   Secondary malignant kori plasmal parotid gland RRAT Score:     17 Do you (patient/family) have any concerns for transition/discharge? Plan for utilizing home health:   Not appropriate at this time. Current Advanced Directive/Advance Care Plan:  Not on file, he has one at the [place]. Likelihood of readmission?   low Transition of Care Plan:       
Chart reviewed for transitions of care, and discussed in rounds. CM met with patient at bedside to explain role and offer support. Patient is alert and oriented x4, and confirmed demographics. He is independent at home with ADLs and IADLs, and does not have any DME. His wife will drive him home at time of discharge. CM will follow for any other discharge needs.  
 
Ethel AmezcuaKing[facility] Hospital

## 2019-04-05 NOTE — PROGRESS NOTES
Bedside and Verbal shift change report given to Sapna Pitts (oncoming nurse) by Lico Gutierrez RN (offgoing nurse).  Report included the following information SBAR, Kardex, Intake/Output, MAR and Recent Results.

## 2019-04-05 NOTE — OP NOTES
[facility]  OPERATIVE REPORT    Name:  Minerva Nogueira  MR#:  [id number]  :  1931  ACCOUNT #:  [de-identified]  DATE OF SERVICE:  2019    PREOPERATIVE DIAGNOSES:  1. Right parotid mass. 2.  History of squamous cell carcinoma of the right face, status post surgery and radiation. 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 PROCEDURE PERFORMED:  1. Right partial auriculectomy. 2.  Right total parotidectomy with neck dissection. 3.  Right selective neck dissection levels 2 through 4.  4.  Advancement flap closure of neck/ear defect 24 square cm.  5.  Intraoperative facial nerve monitoring    SURGEON:  Ruy Alfaro Iv, MD    ASSISTANT:  Telly Regalado MD    ANESTHESIA:  General endotracheal anesthesia. COMPLICATIONS:  None. SPECIMENS REMOVED:  Right parotid, right neck dissection. IMPLANTS:  none. ESTIMATED BLOOD LOSS:  100 mL. INDICATIONS:  This is an 59-year-old gentleman with a history of numerous skin cancers of the face. He presented with a mass in the right parotid. He previously had skin cancer in this area resected and treated with postoperative radiation therapy. Imaging showed a necrotic-appearing mass in the parotid. Fine needle aspiration was atypical and suspicious for carcinoma. Based on this, he was scheduled for elective resection. Risks of surgery discussed including bleeding, infection, pain, skin and ear numbness, shoulder weakness, facial nerve injury, cosmetic deformity, need for further surgery. OPERATIVE DETAIL:  The patient was brought to the operating room and general endotracheal anesthesia was induced. The patient was then prepped and draped in the usual sterile fashion. Electrodes were placed to monitor orbicularis oris and orbicularis oculi muscles.   There was an ulceration at the insertion of the lobule of the ear that had deformed somewhat and was consistent with the tumor extending into the skin. Additionally, there was some ulceration in the postauricular sulcus. Therefore, it was apparent that the lower portion of the ear would need to be resected in order to clear the mass. Incisions were then designed. An elliptical incision was created on the posterior surface of the ear so that the ulceration of postauricular sulcus could be resected. The anterior incision was then extended across the lower portion of the conchal bowl anteriorly just to the inferior edge of tragus. The posterior portion of the postauricular incision was then extended around inferior to the lobule of the ear and then up into the preauricular region. This portion of the incision was then extended up to the superior aspect of the tragus. This was all infiltrated with 1% lidocaine with epinephrine 1:100,000. The incisions were made with a knife. The anterior skin flap was raised in the plane over the parotid fascia. Posteriorly, the skin was then raised up off the cartilage of the external ear. There was no gross invasion into the upper portion of the cartilage. Posteriorly, the tissue was raised up off the underlying musculature and mastoid periosteum. A knife was then used to cut along of the posterior aspect of the conchal bowl. Unfortunately, at this time, it was apparent the mass was quite fixed making it difficult to identify the main trunk of the facial nerve. Therefore, decision was made to find the nerves distally and trace them back proximally. The marginal nerve was identified distally. [place] was then used to being tracing it proximally. Just posterior to the mandible was encountered severely fibrotic tissue that was consistent with tumor. Additional facial nerve branches were identified sequentially, distally and traced proximally. Again, severe fibrotic tissue was encountered as we approached the pes. Ultimately, the pes was able to be exposed.   However, there was gross tumor surrounding the lower division of the nerve. Ultimately, this could not be dissected out, so the lower division was ligated. This tissue was sent to verify malignant tumor. Frozen section returned as poorly differentiated carcinoma with perineural invasion. Once the main trunk of the facial nerve was identified, dissection continued posteriorly towards the stylomastoid foramen. The mass could then be rotated inferiorly freeing the remaining attachments of the sternocleidomastoid muscle freeing the superficial parotid and partial auriculectomy. This was sent for permanent section. There was gross tumor in the region of the angle of the mandible as well as extending into the remaining deep parotid tissue. This was all mobilized, taking a cuff of masseter, ligating the external carotid artery at the angle of the mandible and freeing all of the tissue from deep to the main trunk of the facial nerve. This was then sent as the deep lobe parotid tissue. The cut end of the lower division of the facial nerve was sent as a separate permanent specimen. Attention was then turned towards the neck. A curvilinear line was then designed that continued from the posterior and inferior aspect of the neck and ear wound, which was approximately 20 sq cm in size. The dissection was started inferiorly and anteriorly to create an anteriorly and inferiorly based advancement flap to close the wound. This was raised in the subplatysmal plane. The fascia was then stripped off the sternocleidomastoid muscle with Bovie down towards the floor of the neck. The spinal accessory nerve was identified, dissected free and carefully preserved. The Fibrofatty  tissue in level IIB was then mobilized and retracted anteriorly under the nerve. A 15 blade was then used to raise the neck dissection contents up off the floor of the neck sparing spinal rootlets as they were encountered.   A 15 blade was then used to free the neck dissection contents from the jugular vein and the internal carotid artery. The hypoglossal was identified and preserved in the process as was the vagus. The specimen was  into levels 2, 3, and 4 and sent for permanent section. The wound was irrigated and evacuated. Bipolar was used for hemostasis. A 10 flat drain was brought out through a separate stab incision and the neck was secured. The posterior skin flap was raised and advanced anteriorly  The cut edges of the skin on the external ear were also raised above the cartilage. The inferiorly based advancement flap was advanced superiorly and approximated to the cut inferior edge of the external ear. The posterior skin flap was advanced anteriorly as well up to the cut ear as well as reapproximated with the flap itself. All of these incisions were then closed using 3-0 Vicryl to close the deep layers, staples on the skin of the neck and then 4-0 Monocryl on the skin of the face and ear. The posterior cut skin edges on the external ear were approximated down to the cut postauricular scalp skin. At this time, the procedure was terminated. The patient was awakened, extubated, and taken to PACU in stable condition.       MD VOLODYMYR Moran/[email or web address]  D:  04/04/2019 14:15  T:  04/04/2019 17:05  JOB #:  [id number]

## 2019-04-06 VITALS
HEIGHT: 69 IN | TEMPERATURE: 97.7 F | SYSTOLIC BLOOD PRESSURE: 136 MMHG | WEIGHT: 175.04 LBS | HEART RATE: 88 BPM | DIASTOLIC BLOOD PRESSURE: 85 MMHG | BODY MASS INDEX: 25.93 KG/M2 | OXYGEN SATURATION: 92 % | RESPIRATION RATE: 16 BRPM

## 2019-04-06 PROCEDURE — 74011250637 HC RX REV CODE- 250/637: Performed by: OTOLARYNGOLOGY

## 2019-04-06 PROCEDURE — 74011000250 HC RX REV CODE- 250: Performed by: OTOLARYNGOLOGY

## 2019-04-06 RX ADMIN — POTASSIUM CHLORIDE 10 MEQ: 750 TABLET, EXTENDED RELEASE ORAL at 09:27

## 2019-04-06 RX ADMIN — AMLODIPINE BESYLATE 5 MG: 5 TABLET ORAL at 09:28

## 2019-04-06 RX ADMIN — LISINOPRIL 40 MG: 20 TABLET ORAL at 09:27

## 2019-04-06 RX ADMIN — Medication 10 ML: at 06:08

## 2019-04-06 RX ADMIN — ACETAMINOPHEN 650 MG: 325 TABLET ORAL at 06:06

## 2019-04-06 RX ADMIN — LOVASTATIN 20 MG: 20 TABLET ORAL at 09:27

## 2019-04-06 RX ADMIN — BACITRACIN 1 PACKET: 500 OINTMENT TOPICAL at 09:28

## 2019-04-06 NOTE — PROGRESS NOTES
Patients and wife verb understanding of all discharge orders, scripts and follow up appointments. Pt MESSI discontinued and patient tolerated well. Pt given extra dressings, tape and bandaides to aide in the dressing changes for home. Pt and wife verb understanding.

## 2019-04-06 NOTE — DISCHARGE SUMMARY
VSS Afebrile minimal drainage. Wounds are clean,dry and flat and he wishes discharge today. Will fu with Dr Lees next week

## 2019-04-06 NOTE — DISCHARGE INSTRUCTIONS
Keep wound dry and apply bacitracint ointment to incision bid. Call for drainage,increased swelling,redness or pain

## 2019-04-06 NOTE — PROGRESS NOTES
[address] [facility] [address]. Jeanne Del Real was notified about patient's right side face redness and swelling. He said it is expected and he will assess the patient in the morning.

## 2019-04-20 ENCOUNTER — HOSPITAL ENCOUNTER (EMERGENCY)
Age: 84
Discharge: HOME | End: 2019-04-20
Attending: EMERGENCY MEDICINE | Admitting: EMERGENCY MEDICINE
Payer: MEDICARE

## 2019-04-20 VITALS
BODY MASS INDEX: 23.13 KG/M2 | WEIGHT: 161.6 LBS | SYSTOLIC BLOOD PRESSURE: 175 MMHG | HEART RATE: 88 BPM | HEIGHT: 70 IN | TEMPERATURE: 98.1 F | OXYGEN SATURATION: 95 % | RESPIRATION RATE: 18 BRPM | DIASTOLIC BLOOD PRESSURE: 83 MMHG

## 2019-04-20 DIAGNOSIS — H16.001 ULCER OF RIGHT CORNEA: Primary | ICD-10-CM

## 2019-04-20 PROCEDURE — 99282 EMERGENCY DEPT VISIT SF MDM: CPT

## 2019-04-20 PROCEDURE — 74011000250 HC RX REV CODE- 250: Performed by: EMERGENCY MEDICINE

## 2019-04-20 RX ORDER — POLYVINYL ALCOHOL 14 MG/ML
1 SOLUTION/ DROPS OPHTHALMIC AS NEEDED
COMMUNITY
End: 2020-04-12

## 2019-04-20 RX ORDER — SULFACETAMIDE SODIUM 100 MG/ML
2 SOLUTION/ DROPS OPHTHALMIC
Qty: 15 ML | Refills: 0 | Status: SHIPPED | OUTPATIENT
Start: 2019-04-20 | End: 2019-04-27

## 2019-04-20 RX ADMIN — FLUORESCEIN SODIUM 2 STRIP: 0.6 STRIP OPHTHALMIC at 11:45

## 2019-04-20 NOTE — ED NOTES
Patient discharged home after receiving discharge instructions from MD.  Patient and wife voiced understanding and doesn't have any questions at this time. Patient in no distress at this time. Pt ambulated out of the ER with wife. This nurse called pt's pharmacy to ensure they have Rx and lubricating eye ointment as ordered.

## 2019-04-20 NOTE — ED TRIAGE NOTES
Here a couple weeks ago for right eye pain, states \"it hasn't gotten any better. \"  Pt had surgery to right side of neck/ear at [facility]. Pt is unsure why they did the surgery \"they won't tell me anything. \" pt has staples to right side of neck. Pt has no vision changes, but photophobic.

## 2019-04-20 NOTE — ED PROVIDER NOTES
HPI The patient complains of right eye pain/arm by a sensation that he has had for approximately 3 weeks. He was seen here and diagnosed with atopic conjunctivitis and given antihistamine drops but it has not helped. 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 He is unable to completely close his right eye. He has no other complaints. On exam with flurescein/Wood's lamp there is mild ulceration of the mid lateral cornea and there is also mild injection of the conjunctiva. No discharge is noted. No foreign body is noted. Past Medical History:  
Diagnosis Date  Brain tumor ([address] frontal; behind right eye.  Cancer ([address] BCC SEVERAL AREAS - MOSTLY HEAD AND FACE  Family history of skin cancer  Hypertension  Other ill-defined conditions(799.89)   
 elevated cholesterol  Radiation exposure  Sun-damaged skin Past Surgical History:  
Procedure Laterality Date [address] [place] [address] UNLISTED  0825M, 2009 HERNIA X2  
 HX COLONOSCOPY  2009  HX GI    
 inguinal hernia repairx 2  
 HX HEENT  2009 NASAL RECONSTRUCTION DUE TO BCC  HX MOHS PROCEDURES  07/09/2018 BCC L chin by Dr. Philippe Moreno  HX OTHER SURGICAL    
 many skin cancer procedures/nose,ear  REPAIR NASAL CAVITY STENOSIS  10/11/2010 NASAL RECONSTRUCTION performed by Leonie Madera. at [facility] MAIN OR Family History:  
Problem Relation Age of Onset  Heart Failure Mother  Cancer Father PENILE  Kidney Disease Sister  Cancer Brother LUNG  
 No Known Problems Sister  Anesth Problems Neg Hx Social History Socioeconomic History  Marital status:  Spouse name: Not on file  Number of children: Not on file  Years of education: Not on file  Highest education level: Not on file Occupational History  Not on file Social Needs  Financial resource strain: Not on file  Food insecurity:  
  Worry: Not on file Inability: Not on file  Transportation needs:  
  Medical: Not on file Non-medical: Not on file Tobacco Use  Smoking status: Former Smoker Packs/day: 1.00 Years: 4.00 Pack years: 4.00 Last attempt to quit: 1960 Years since quittin.2  Smokeless tobacco: Never Used Substance and Sexual Activity  Alcohol use: No  
  Frequency: Never  Drug use: No  
 Sexual activity: Not on file Lifestyle  Physical activity:  
  Days per week: Not on file Minutes per session: Not on file  Stress: Not on file Relationships  Social connections:  
  Talks on phone: Not on file Gets together: Not on file Attends Buddhist service: Not on file Active member of club or organization: Not on file Attends meetings of clubs or organizations: Not on file Relationship status: Not on file  Intimate partner violence:  
  Fear of current or ex partner: Not on file Emotionally abused: Not on file Physically abused: Not on file Forced sexual activity: Not on file Other Topics Concern  Not on file Social History Narrative  Not on file ALLERGIES: Patient has no known allergies. Review of Systems Eyes: Positive for pain and redness. Negative for visual disturbance. Vitals:  
 19 1108 BP: 175/83 Pulse: 88 Resp: 18 Temp: 98.1 °F (36.7 °C) SpO2: 95% Weight: 73.3 kg (161 lb 9.6 oz) Height: 5' 10\" (1.778 m) Physical Exam  
Constitutional: He appears well-developed. No distress. Eyes: Pupils are equal, round, and reactive to light. EOM are normal.  
See hpi Neck: Normal range of motion. Cardiovascular: Normal rate. Pulmonary/Chest: Effort normal.  
Musculoskeletal: Normal range of motion. Neurological: He is alert. r 7th nerve palsy Psychiatric: He has a normal mood and affect. His behavior is normal.  
  
 
MDM Procedures

## 2019-04-22 NOTE — DISCHARGE SUMMARY
Discharge Summary     Patient: Patricio Blackwood MRN: [id number]  SSN: [id number]    YOB: 1931  Age: 80 y.o. Sex: male       Admit Date: 4/4/2019    Discharge Date: 4/6/2019     Admission Diagnoses: Secondary malignant neoplasm of parotid gland [facility]) [C79.89]    Discharge Diagnoses:   Problem List as of 4/6/2019 Date Reviewed: 4/4/2019          Codes Class Noted - Resolved    Other and unspecified malignant neoplasm of skin of other and unspecified parts of face ICD-10-CM: 173.3  ICD-9-CM: 173.3  10/11/2010 - Present        Acquired deformity of nose ICD-10-CM: M95.0  ICD-9-CM: 738.0  10/11/2010 - Present        Secondary malignant neoplasm of parotid gland ([facility] ICD-10-CM: C79.89  ICD-9-CM: 198.89  4/4/2019 - Present          . Procedures Performed: RIGHT NECK DISSECTION; Right parotidectomy; and partial auriculectomy (Right Neck)     Discharge Condition: Good    Hospital Course: He underwent the above listed procedures and was admitted to the floor. He was allowed to resume an oral diet. By POD 2 his pain was controlled and he was able to tolerate a diet. He was then discharged home. Consults: None    Disposition: home    Discharge Medications:   Cannot display discharge medications since this patient is not currently admitted. Activity: No heavy lifting for 4 weeks  Diet: Regular Diet  Wound Care: Keep wound clean and dry    No orders of the defined types were placed in this encounter.       Signed By: Abel Napoles MD     April 22, 2019

## 2019-06-12 ENCOUNTER — HOSPITAL ENCOUNTER (EMERGENCY)
Age: 84
Discharge: HOME | End: 2019-06-12
Attending: EMERGENCY MEDICINE
Payer: MEDICARE

## 2019-06-12 VITALS
TEMPERATURE: 98 F | HEART RATE: 77 BPM | WEIGHT: 158.73 LBS | BODY MASS INDEX: 22.72 KG/M2 | SYSTOLIC BLOOD PRESSURE: 172 MMHG | HEIGHT: 70 IN | OXYGEN SATURATION: 98 % | RESPIRATION RATE: 16 BRPM | DIASTOLIC BLOOD PRESSURE: 94 MMHG

## 2019-06-12 DIAGNOSIS — K59.00 CONSTIPATION, UNSPECIFIED CONSTIPATION TYPE: ICD-10-CM

## 2019-06-12 DIAGNOSIS — K64.9 HEMORRHOIDS, UNSPECIFIED HEMORRHOID TYPE: Primary | ICD-10-CM

## 2019-06-12 PROCEDURE — 74011000250 HC RX REV CODE- 250: Performed by: EMERGENCY MEDICINE

## 2019-06-12 PROCEDURE — 99282 EMERGENCY DEPT VISIT SF MDM: CPT

## 2019-06-12 RX ORDER — MAGNESIUM CITRATE
296 SOLUTION, ORAL ORAL
Qty: 1 BOTTLE | Refills: 0 | Status: SHIPPED | OUTPATIENT
Start: 2019-06-12 | End: 2019-06-12

## 2019-06-12 RX ORDER — LIDOCAINE HYDROCHLORIDE 20 MG/ML
10 SOLUTION OROPHARYNGEAL
Status: COMPLETED | OUTPATIENT
Start: 2019-06-12 | End: 2019-06-12

## 2019-06-12 RX ADMIN — LIDOCAINE HYDROCHLORIDE 10 ML: 20 SOLUTION ORAL; TOPICAL at 09:49

## 2019-06-12 NOTE — DISCHARGE INSTRUCTIONS
Patient Education        Constipation: Care Instructions  Your Care Instructions    Constipation means that you have a hard time passing stools (bowel movements). People pass stools from 3 times a day to once every 3 days. What is normal for you may be different. Constipation may occur with pain in the rectum and cramping. The pain may get worse when you try to pass stools. Sometimes there are small amounts of bright red blood on toilet paper or the surface of stools. This is because of enlarged veins near the rectum (hemorrhoids). A few changes in your diet and lifestyle may help you avoid ongoing constipation. Your doctor may also prescribe medicine to help loosen your stool. Some medicines can cause constipation. These include pain medicines and antidepressants. Tell your doctor about all the medicines you take. Your doctor may want to make a medicine change to ease your symptoms. Follow-up care is a key part of your treatment and safety. Be sure to make and go to all appointments, and call your doctor if you are having problems. It's also a good idea to know your test results and keep a list of the medicines you take. How can you care for yourself at home? · Drink plenty of fluids, enough so that your urine is light yellow or clear like water. If you have kidney, heart, or liver disease and have to limit fluids, talk with your doctor before you increase the amount of fluids you drink. · Include high-fiber foods in your diet each day. These include fruits, vegetables, beans, and whole grains. · Get at least 30 minutes of exercise on most days of the week. Walking is a good choice. You also may want to do other activities, such as running, swimming, cycling, or playing tennis or team sports. · Take a fiber supplement, such as Citrucel or Metamucil, every day. Read and follow all instructions on the label. · Schedule time each day for a bowel movement. A daily routine may help.  Take your time having your bowel movement. · Support your feet with a small step stool when you sit on the toilet. This helps flex your hips and places your pelvis in a squatting position. · Your doctor may recommend an over-the-counter laxative to relieve your constipation. Examples are Milk of Magnesia and MiraLax. Read and follow all instructions on the label. Do not use laxatives on a long-term basis. When should you call for help? Call your doctor now or seek immediate medical care if:    · You have new or worse belly pain.     · You have new or worse nausea or vomiting.     · You have blood in your stools.    Watch closely for changes in your health, and be sure to contact your doctor if:    · Your constipation is getting worse.     · You do not get better as expected. Where can you learn more? Go to http://tenzin-henrry.info/. Enter 21 [id number] in the search box to learn more about \"Constipation: Care Instructions. \"  Current as of: September 23, 2018  Content Version: 11.9  © 9713-9350 [employer]. Care instructions adapted under license by [employer] (which disclaims liability or warranty for this information). If you have questions about a medical condition or this instruction, always ask your healthcare professional. [place] [address] any warranty or liability for your use of this information. Patient Education        Hemorrhoids: Care Instructions  Your Care Instructions    Hemorrhoids are enlarged veins that develop in the anal canal. Bleeding during bowel movements, itching, swelling, and rectal pain are the most common symptoms. They can be uncomfortable at times, but hemorrhoids rarely are a serious problem. You can treat most hemorrhoids with simple changes to your diet and bowel habits. These changes include eating more fiber and not straining to pass stools.  Most hemorrhoids do not need surgery or other treatment unless they are very large and painful or bleed a lot. Follow-up care is a key part of your treatment and safety. Be sure to make and go to all appointments, and call your doctor if you are having problems. It's also a good idea to know your test results and keep a list of the medicines you take. How can you care for yourself at home? · Sit in a few inches of warm water (sitz bath) 3 times a day and after bowel movements. The warm water helps with pain and itching. · Put ice on your anal area several times a day for 10 minutes at a time. Put a thin cloth between the ice and your skin. Follow this by placing a warm, wet towel on the area for another 10 to 20 minutes. · Take pain medicines exactly as directed. ? If the doctor gave you a prescription medicine for pain, take it as prescribed. ? If you are not taking a prescription pain medicine, ask your doctor if you can take an over-the-counter medicine. · Keep the anal area clean, but be gentle. Use water and a fragrance-free soap, such as [place], or use baby wipes or medicated pads, such as Tucks. · Wear cotton underwear and loose clothing to decrease moisture in the anal area. · Eat more fiber. Include foods such as whole-grain breads and cereals, raw vegetables, raw and dried fruits, and beans. · Drink plenty of fluids, enough so that your urine is light yellow or clear like water. If you have kidney, heart, or liver disease and have to limit fluids, talk with your doctor before you increase the amount of fluids you drink. · Use a stool softener that contains bran or psyllium. You can save money by buying bran or psyllium (available in bulk at most health food stores) and sprinkling it on foods or stirring it into fruit juice. Or you can use a product such as Metamucil or Hydrocil. · Practice healthy bowel habits. ? Go to the bathroom as soon as you have the urge. ? Avoid straining to pass stools. Relax and give yourself time to let things happen naturally.   ? Do not hold your breath while passing stools. ? Do not read while sitting on the toilet. Get off the toilet as soon as you have finished. · Take your medicines exactly as prescribed. Call your doctor if you think you are having a problem with your medicine. When should you call for help? Call 911 anytime you think you may need emergency care. For example, call if:    · You pass maroon or very bloody stools.    Call your doctor now or seek immediate medical care if:    · You have increased pain.     · You have increased bleeding.    Watch closely for changes in your health, and be sure to contact your doctor if:    · Your symptoms have not improved after 3 or 4 days. Where can you learn more? Go to http://tenzin-henrry.info/. Enter F228 in the search box to learn more about \"Hemorrhoids: Care Instructions. \"  Current as of: March 27, 2018  Content Version: 11.9  © 5030-3130 [employer]. Care instructions adapted under license by [employer] (which disclaims liability or warranty for this information). If you have questions about a medical condition or this instruction, always ask your healthcare professional. [place] [address] any warranty or liability for your use of this information.

## 2019-06-12 NOTE — ED PROVIDER NOTES
'last bm yesterday/ it was 'hard'; now has a hemorrhoid 'that popped out yesterday/ gone now'; have been constipated 'for quite a while'/ tried colace once, didn't help so I never took it again';     pt denies HA, vison changes, diff swallowing, CP, SOB, Abd pain, F/Ch, N/V, D/Cons or other current systemic complaints    No pain currently               Past Medical History:   Diagnosis Date    Brain tumor ([facility] [address]     frontal; behind right eye.     Cancer ([facility] [address]     [facility] SEVERAL AREAS - MOSTLY HEAD AND FACE    Family history of skin cancer     Hypertension     Other ill-defined conditions(799.89)     elevated cholesterol    Radiation exposure     Sun-damaged skin        Past Surgical History:   Procedure Laterality Date    ABDOMEN SURGERY PROC UNLISTED  1950s, 2009    HERNIA X2    HX COLONOSCOPY  2009    HX GI      inguinal hernia repairx 2    HX HEENT  2009    NASAL RECONSTRUCTION DUE TO [facility]    HX MOHS PROCEDURES  07/09/2018    BCC L chin by Dr. Ayush Swan      many skin cancer procedures/nose,ear    REPAIR NASAL CAVITY STENOSIS  10/11/2010    NASAL RECONSTRUCTION performed by Abbi Garces. at [facility] MAIN OR         Family History:   Problem Relation Age of Onset    Heart Failure Mother     Cancer Father         PENILE    Kidney Disease Sister     Cancer Brother         LUNG    No Known Problems Sister     Anesth Problems Neg Hx        Social History     Socioeconomic History    Marital status:      Spouse name: Not on file    Number of children: Not on file    Years of education: Not on file    Highest education level: Not on file   Occupational History    Not on file   Social Needs    Financial resource strain: Not on file    Food insecurity:     Worry: Not on file     Inability: Not on file    Transportation needs:     Medical: Not on file     Non-medical: Not on file   Tobacco Use    Smoking status: Former Smoker     Packs/day: 1.00     Years: 4.00     Pack years: 4.00     Last attempt to quit: 1960     Years since quittin.4    Smokeless tobacco: Never Used   Substance and Sexual Activity    Alcohol use: No     Frequency: Never    Drug use: No    Sexual activity: Not on file   Lifestyle    Physical activity:     Days per week: Not on file     Minutes per session: Not on file    Stress: Not on file   Relationships    Social connections:     Talks on phone: Not on file     Gets together: Not on file     Attends [employer] service: Not on file     Active member of club or organization: Not on file     Attends meetings of clubs or organizations: Not on file     Relationship status: Not on file    Intimate partner violence:     Fear of current or ex partner: Not on file     Emotionally abused: Not on file     Physically abused: Not on file     Forced sexual activity: Not on file   Other Topics Concern    Not on file   Social History Narrative    Not on file         ALLERGIES: Patient has no known allergies. Review of Systems   Constitutional: Negative for chills and fever. HENT: Negative for trouble swallowing and voice change. Gastrointestinal: Positive for constipation. Negative for abdominal pain, anal bleeding, blood in stool, nausea and vomiting. All other systems reviewed and are negative. Vitals:    19 0932   BP: (!) 172/94   Pulse: 77   Resp: 16   Temp: 98 °F (36.7 °C)   SpO2: 98%   Weight: 72 kg (158 lb 11.7 oz)   Height: 5' 10\" (1.778 m)            Physical Exam   Constitutional: He is oriented to person, place, and time. He appears well-developed and well-nourished. No distress. NAD, AxOx4, speaking in complete sentences  Obvious R facial deformity 2ary to 'skin cancer surgery'   HENT:   Head: Normocephalic and atraumatic. Mouth/Throat: Oropharynx is clear and moist. No oropharyngeal exudate. Eyes: Pupils are equal, round, and reactive to light. Conjunctivae and EOM are normal. Right eye exhibits no discharge.  Left eye exhibits no discharge. Neck: Normal range of motion. Neck supple. Cardiovascular: Normal rate, regular rhythm, normal heart sounds and intact distal pulses. Exam reveals no gallop and no friction rub. No murmur heard. Pulmonary/Chest: Effort normal and breath sounds normal. No respiratory distress. He has no wheezes. He has no rales. He exhibits no tenderness. Abdominal: Soft. Bowel sounds are normal. He exhibits no distension and no mass. There is no tenderness. There is no rebound and no guarding. nttp     Musculoskeletal: Normal range of motion. He exhibits no edema, tenderness or deformity. Lymphadenopathy:     He has no cervical adenopathy. Neurological: He is alert and oriented to person, place, and time. No cranial nerve deficit. Coordination normal.   Skin: Skin is warm and dry. Capillary refill takes less than 2 seconds. No rash noted. No erythema. Psychiatric: He has a normal mood and affect. Nursing note and vitals reviewed. MDM       Procedures    Procedure Note - Rectal Exam:   9:49 AM  Performed by: Flower Watkins MD  Chaperoned by: Wife  Rectal exam performed. brn stool was collected. Stool was collected and sent to the lab for Hemoccult testing. Other findings: no ext hemorrhoid/ ? Int hemorrhoid/ no fissure/ no impaction  The procedure took 1-15 minutes, and pt tolerated well    9:50 AM  Macarena Espinosa  results have been reviewed with him. He has been counseled regarding his diagnosis. He verbally conveys understanding and agreement of the signs, symptoms, diagnosis, treatment and prognosis and additionally agrees to Call/ Arrange follow up as recommended with PEARL Rodas in 24 - 48 hours. He also agrees with the care-plan and conveys that all of his questions have been answered.   I have also put together some discharge instructions for him that include: 1) educational information regarding their diagnosis, 2) how to care for their diagnosis at home, as well a 3) list of reasons why they would want to return to the ED prior to their follow-up appointment, should their condition change or for concerns. Andre Monge

## 2020-04-12 ENCOUNTER — HOSPITAL ENCOUNTER (EMERGENCY)
Age: 85
Discharge: HOME | End: 2020-04-12
Attending: STUDENT IN AN ORGANIZED HEALTH CARE EDUCATION/TRAINING PROGRAM | Admitting: STUDENT IN AN ORGANIZED HEALTH CARE EDUCATION/TRAINING PROGRAM
Payer: MEDICARE

## 2020-04-12 VITALS
SYSTOLIC BLOOD PRESSURE: 158 MMHG | OXYGEN SATURATION: 96 % | DIASTOLIC BLOOD PRESSURE: 92 MMHG | TEMPERATURE: 97 F | RESPIRATION RATE: 16 BRPM | HEART RATE: 64 BPM | BODY MASS INDEX: 23.36 KG/M2 | HEIGHT: 70 IN | WEIGHT: 163.14 LBS

## 2020-04-12 DIAGNOSIS — K64.9 BLEEDING HEMORRHOID: Primary | ICD-10-CM

## 2020-04-12 PROCEDURE — 99282 EMERGENCY DEPT VISIT SF MDM: CPT

## 2020-04-12 NOTE — ED PROVIDER NOTES
Patient is an 30-year-old male presenting to the emergency department for a bleeding hemorrhoid. Patient states that he was lying in bed last night when he was having some rectal itching and he went to scratch then this morning noticed that he had some bright red blood on the toilet paper. Nominal pain denies any diarrhea or constipation. Past Medical History:   Diagnosis Date    Brain tumor ([address]     frontal; behind right eye.     Cancer ([address]     [address] SEVERAL AREAS - MOSTLY HEAD AND FACE    Family history of skin cancer     Hypertension     Other ill-defined conditions(799.89)     elevated cholesterol    Radiation exposure     Sun-damaged skin        Past Surgical History:   Procedure Laterality Date    ABDOMEN SURGERY PROC UNLISTED  1950s, 2009    HERNIA X2    HX COLONOSCOPY  2009    HX GI      inguinal hernia repairx 2    HX HEENT  2009    NASAL RECONSTRUCTION DUE  [address]    HX MOHS PROCEDURES  07/09/2018    BCC L chin by Dr. Radha Roe      many skin cancer procedures/nose,ear    [place] REPAIR NASAL CAVITY STENOSIS  10/11/2010    NASAL RECONSTRUCTION performed by Noble Pitts. at [facility] MAIN OR         Family History:   Problem Relation Age of Onset    Heart Failure Mother     Cancer Father         PENILE    Kidney Disease Sister     Cancer Brother         LUNG    No Known Problems Sister     Anesth Problems Neg Hx        Social History     Socioeconomic History    Marital status:      Spouse name: Not on file    Number of children: Not on file    Years of education: Not on file    Highest education level: Not on file   Occupational History    Not on file   Social Needs    Financial resource strain: Not on file    Food insecurity     Worry: Not on file     Inability: Not on file    Transportation needs     Medical: Not on file     Non-medical: Not on file   Tobacco Use    Smoking status: Former Smoker     Packs/day: 1.00     Years: 4.00     Pack years: 4.00     Last attempt to quit: 1960     Years since quittin.2    Smokeless tobacco: Never Used   Substance and Sexual Activity    Alcohol use: No     Frequency: Never    Drug use: No    Sexual activity: Not on file   Lifestyle    Physical activity     Days per week: Not on file     Minutes per session: Not on file    Stress: Not on file   Relationships    Social connections     Talks on phone: Not on file     Gets together: Not on file     Attends Church service: Not on file     Active member of club or organization: Not on file     Attends meetings of clubs or organizations: Not on file     Relationship status: Not on file    Intimate partner violence     Fear of current or ex partner: Not on file     Emotionally abused: Not on file     Physically abused: Not on file     Forced sexual activity: Not on file   Other Topics Concern    Not on file   Social History Narrative    Not on file         ALLERGIES: Patient has no known allergies. Review of Systems   Gastrointestinal: Positive for anal bleeding. All other systems reviewed and are negative. Vitals:    20 1105   BP: (!) 158/92   Pulse: 64   Resp: 16   Temp: 97 °F (36.1 °C)   SpO2: 96%   Weight: 74 kg (163 lb 2.3 oz)   Height: 5' 9.75\" (1.772 m)            Physical Exam  Vitals signs and nursing note reviewed. Constitutional:       Appearance: Normal appearance. Eyes:      Extraocular Movements: Extraocular movements intact. Conjunctiva/sclera: Conjunctivae normal.      Pupils: Pupils are equal, round, and reactive to light. Pulmonary:      Effort: Pulmonary effort is normal.      Breath sounds: Normal breath sounds. Genitourinary:     Rectum: External hemorrhoid present. Comments: Thrombosed hemorrhoid with small superficial abrasion lateral to the hemorrhoid. Neurological:      General: No focal deficit present. Mental Status: He is alert and oriented to person, place, and time.    Psychiatric: Mood and Affect: Mood normal.          MDM  Number of Diagnoses or Management Options  Bleeding hemorrhoid:   Diagnosis management comments: A/P: Bleeding hemorrhoid. 66-year-old male presenting with bleeding hemorrhoid as well as superficial abrasion. Patient to be discharged advised patient to use Preparation H sitz bath's and apply 4 x 4's to the rectum until symptoms resolve.     Risk of Complications, Morbidity, and/or Mortality  Presenting problems: low  Diagnostic procedures: low  Management options: low    Patient Progress  Patient progress: stable         Procedures

## 2020-04-12 NOTE — ED TRIAGE NOTES
\"Itchiness around his anus last night, and this morning wiped up bright red drops of blood this morning. I think I scratched the hemorrhoid with my nail last night before I went to bed. \" No bleeding now after putting cream on it this morning. No pain.

## 2020-04-12 NOTE — DISCHARGE INSTRUCTIONS
Patient Education        Hemorrhoids: Care Instructions  Your Care Instructions    Hemorrhoids are enlarged veins that develop in the anal canal. Bleeding during bowel movements, itching, swelling, and rectal pain are the most common symptoms. They can be uncomfortable at times, but hemorrhoids rarely are a serious problem. You can treat most hemorrhoids with simple changes to your diet and bowel habits. These changes include eating more fiber and not straining to pass stools. Most hemorrhoids do not need surgery or other treatment unless they are very large and painful or bleed a lot. Follow-up care is a key part of your treatment and safety. Be sure to make and go to all appointments, and call your doctor if you are having problems. It's also a good idea to know your test results and keep a list of the medicines you take. How can you care for yourself at home? · Sit in a few inches of warm water (sitz bath) 3 times a day and after bowel movements. The warm water helps with pain and itching. · Put ice on your anal area several times a day for 10 minutes at a time. Put a thin cloth between the ice and your skin. Follow this by placing a warm, wet towel on the area for another 10 to 20 minutes. · Take pain medicines exactly as directed. ? If the doctor gave you a prescription medicine for pain, take it as prescribed. ? If you are not taking a prescription pain medicine, ask your doctor if you can take an over-the-counter medicine. · Keep the anal area clean, but be gentle. Use water and a fragrance-free soap, such as [place], or use baby wipes or medicated pads, such as Tucks. · Wear cotton underwear and loose clothing to decrease moisture in the anal area. · Eat more fiber. Include foods such as whole-grain breads and cereals, raw vegetables, raw and dried fruits, and beans. · Drink plenty of fluids, enough so that your urine is light yellow or clear like water.  If you have kidney, heart, or liver disease and have to limit fluids, talk with your doctor before you increase the amount of fluids you drink. · Use a stool softener that contains bran or psyllium. You can save money by buying bran or psyllium (available in bulk at most health food stores) and sprinkling it on foods or stirring it into fruit juice. Or you can use a product such as Metamucil or Hydrocil. · Practice healthy bowel habits. ? Go to the bathroom as soon as you have the urge. ? Avoid straining to pass stools. Relax and give yourself time to let things happen naturally. ? Do not hold your breath while passing stools. ? Do not read while sitting on the toilet. Get off the toilet as soon as you have finished. · Take your medicines exactly as prescribed. Call your doctor if you think you are having a problem with your medicine. When should you call for help? Call 911 anytime you think you may need emergency care. For example, call if:    · You pass maroon or very bloody stools.    Call your doctor now or seek immediate medical care if:    · You have increased pain.     · You have increased bleeding.    Watch closely for changes in your health, and be sure to contact your doctor if:    · Your symptoms have not improved after 3 or 4 days. Where can you learn more? Go to http://tenzin-henrry.info/  Enter F228 in the search box to learn more about \"Hemorrhoids: Care Instructions. \"  Current as of: August 11, 2019Content Version: 12.4  © 1614-2951 [employer]. Care instructions adapted under license by [employer] (which disclaims liability or warranty for this information). If you have questions about a medical condition or this instruction, always ask your healthcare professional. [address] any warranty or liability for your use of this information.

## 2020-04-12 NOTE — ED NOTES
The patient was discharged home by Dr Rita Palacios in stable condition. The patient is alert and oriented, in no respiratory distress. The patient's diagnosis, condition and treatment were explained. The patient expressed understanding. A discharge plan has been developed. A  was not involved in the process. Aftercare instructions were given. Pt ambulatory out of the ED. Placed a 4X4 gauze between patient's butt cheeks per orders; patient seemed content with that.

## 2021-01-28 ENCOUNTER — HOSPITAL ENCOUNTER (EMERGENCY)
Age: 86
Discharge: HOME | End: 2021-01-28
Attending: EMERGENCY MEDICINE
Payer: MEDICARE

## 2021-01-28 VITALS
SYSTOLIC BLOOD PRESSURE: 175 MMHG | BODY MASS INDEX: 23.7 KG/M2 | TEMPERATURE: 98.2 F | HEART RATE: 72 BPM | WEIGHT: 160 LBS | OXYGEN SATURATION: 98 % | RESPIRATION RATE: 18 BRPM | DIASTOLIC BLOOD PRESSURE: 86 MMHG | HEIGHT: 69 IN

## 2021-01-28 DIAGNOSIS — H16.001 CORNEAL ULCER OF RIGHT EYE: Primary | ICD-10-CM

## 2021-01-28 PROCEDURE — 74011000250 HC RX REV CODE- 250: Performed by: PHYSICIAN ASSISTANT

## 2021-01-28 PROCEDURE — 99284 EMERGENCY DEPT VISIT MOD MDM: CPT

## 2021-01-28 RX ORDER — SULFACETAMIDE SODIUM 100 MG/ML
2 SOLUTION/ DROPS OPHTHALMIC EVERY 4 HOURS
Qty: 1 BOTTLE | Refills: 0 | Status: SHIPPED | OUTPATIENT
Start: 2021-01-28 | End: 2021-02-02

## 2021-01-28 RX ORDER — TETRACAINE HYDROCHLORIDE 5 MG/ML
1 SOLUTION OPHTHALMIC
Status: COMPLETED | OUTPATIENT
Start: 2021-01-28 | End: 2021-01-28

## 2021-01-28 RX ADMIN — TETRACAINE HYDROCHLORIDE 1 DROP: 5 SOLUTION OPHTHALMIC at 14:51

## 2021-01-28 RX ADMIN — FLUORESCEIN SODIUM 1 STRIP: 0.6 STRIP OPHTHALMIC at 14:51

## 2021-01-28 NOTE — ED TRIAGE NOTES
Patient reports right eye drainage (watery) x 1 week, dru had double cataracts surgery last year and saw him and he was unsure what the problem is.  Patient reports hx of radiation over the last 10 years

## 2021-01-28 NOTE — ED NOTES
Chuyita Rojo aware of patient's elevated BP. Advised that patient is able to be discharged. Patient does not appear to be in any acute distress/shows no evidence of clinical instability at this time. Provider has reviewed discharge instructions with the patient/family. The patient/family verbalized understanding instructions as well as need for follow up for any further symptoms.     Discharge papers given, education provided, and any questions answered. Patient discharged by provider.

## 2021-01-28 NOTE — ED NOTES
Visual acuity: uncorrected. Patient states does not wear glasses.     Bilateral Distance: 20/70    Right Distance: 20/70    Left Distance: 20/70

## 2021-01-29 NOTE — ED PROVIDER NOTES
70-year-old male presents to ED due to watering of his right eye for multiple months. Patient states that he has had some watering of the eye over the last year that has been more constant recently. Notes that his right eye also feels somewhat irritated. Denies any change in the vision of his right eye. Denies any fever, headache, nausea, vomiting. Has ophthalmologist who he saw a few months ago and states they were not sure why his eye was watery. 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 Past Medical History:   Diagnosis Date    Brain tumor ([address]     frontal; behind right eye.     Cancer ([facility] [address]     [facility] SEVERAL AREAS - MOSTLY HEAD AND FACE    Family history of skin cancer     Hypertension     Other ill-defined conditions(799.89)     elevated cholesterol    Radiation exposure     Sun-damaged skin        Past Surgical History:   Procedure Laterality Date    ABDOMEN SURGERY PROC UNLISTED  1950s, 2009    HERNIA X2    HX COLONOSCOPY  2009    HX GI      inguinal hernia repairx 2    HX HEENT  2009    NASAL RECONSTRUCTION DUE TO [facility]    HX MOHS PROCEDURES  07/09/2018    BCC L chin by Dr. Jody Caceres      many skin cancer procedures/nose,ear    [place] REPAIR NASAL CAVITY STENOSIS  10/11/2010    NASAL RECONSTRUCTION performed by Jennifer Ray. at [facility] MAIN OR         Family History:   Problem Relation Age of Onset    Heart Failure Mother     Cancer Father         PENILE    Kidney Disease Sister     Cancer Brother         LUNG    No Known Problems Sister     Anesth Problems Neg Hx        Social History     Socioeconomic History    Marital status:      Spouse name: Not on file    Number of children: Not on file    Years of education: Not on file    Highest education level: Not on file   Occupational History    Not on file   Social Needs    Financial resource strain: Not on file    Food insecurity     Worry: Not on file     Inability: Not on file    Transportation needs     Medical: Not on file     Non-medical: Not on file   Tobacco Use    Smoking status: Former Smoker     Packs/day: 1.00     Years: 4.00     Pack years: 4.00     Quit date: 1960     Years since quittin.0    Smokeless tobacco: Never Used   Substance and Sexual Activity    Alcohol use: No     Frequency: Never    Drug use: No    Sexual activity: Not on file   Lifestyle    Physical activity     Days per week: Not on file     Minutes per session: Not on file    Stress: Not on file   Relationships    Social connections     Talks on phone: Not on file     Gets together: Not on file     Attends [employer] service: Not on file     Active member of club or organization: Not on file     Attends meetings of clubs or organizations: Not on file     Relationship status: Not on file    Intimate partner violence     Fear of current or ex partner: Not on file     Emotionally abused: Not on file     Physically abused: Not on file     Forced sexual activity: Not on file   Other Topics Concern    Not on file   Social History Narrative    Not on file         ALLERGIES: Patient has no known allergies. Review of Systems   Constitutional: Negative for fever. HENT: Negative for congestion and sore throat. Eyes: Positive for pain (Irritation) and discharge (Right, watery). Negative for photophobia and visual disturbance. Respiratory: Negative for cough and shortness of breath. Cardiovascular: Negative for chest pain. Gastrointestinal: Negative for nausea and vomiting. Genitourinary: Negative for dysuria. Musculoskeletal: Negative for myalgias. Skin: Negative for rash. Neurological: Negative for dizziness, weakness and headaches.        Vitals:    21 1409 21 1440 21 1531   BP: (!) 173/84  (!) 175/86   Pulse: 82  72   Resp: 18  18   Temp: 98.5 °F (36.9 °C)  98.2 °F (36.8 °C)   SpO2: 97% 97% 98%   Weight: 72.6 kg (160 lb)     Height: 5' 9\" (1.753 m)              Physical Exam  Vitals signs and nursing note reviewed. Constitutional:       General: He is not in acute distress. HENT:      Head: Normocephalic. Nose: Nose normal.      Mouth/Throat:      Mouth: Mucous membranes are moist.   Eyes:      General: Vision grossly intact. Right eye: No foreign body. Left eye: No foreign body. Extraocular Movements: Extraocular movements intact. Right eye: Normal extraocular motion. Left eye: Normal extraocular motion. Conjunctiva/sclera:      Right eye: Right conjunctiva is injected (mildly injected, watery discharge). Left eye: Left conjunctiva is not injected. Pupils: Pupils are equal, round, and reactive to light. Right eye: Corneal abrasion (small abrasion/ulcer of R cornea at  0900 ) present. Neck:      Musculoskeletal: Normal range of motion. Pulmonary:      Effort: Pulmonary effort is normal. No respiratory distress. Skin:     General: Skin is dry. Findings: No rash. Neurological:      Mental Status: He is alert and oriented to person, place, and time. Psychiatric:         Mood and Affect: Mood normal.        Medications   fluorescein (FLU-JENNIFER) 0.6 mg ophthalmic strip 1 Strip (1 Strip Both Eyes Given by Provider 1/28/21 2017)   tetracaine HCl (PF) (PONTOCAINE) 0.5 % ophthalmic solution 1 Drop (1 Drop Right Eye Given by Provider 1/28/21 9857)     Labs Reviewed - No data to display  No orders to display         MDM  Number of Diagnoses or Management Options  Corneal ulcer of right eye  Diagnosis management comments: Differential diagnosis includes corneal abrasion, corneal ulcer, foreign body, conjunctivitis, and others    Mild irritation watery discharge, eye exam reveals small ulcer/abrasion. 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   Discussed with patient is likely due to drying of his eye during the day or overnight. Discussed that he needs to place an eye patch over the right eye to help with him keeping it close and to use drops throughout the day. Discussed that he should follow-up with ophthalmology soon as possible. Discharged with antibiotic drops.     Case reviewed with attending physician, Dr. Srinivasa Guadalupe PA-C  1/28/2021

## 2021-07-17 ENCOUNTER — HOSPITAL ENCOUNTER (EMERGENCY)
Age: 86
Discharge: HOME | End: 2021-07-17
Attending: EMERGENCY MEDICINE
Payer: MEDICARE

## 2021-07-17 VITALS
RESPIRATION RATE: 16 BRPM | HEIGHT: 69 IN | OXYGEN SATURATION: 96 % | BODY MASS INDEX: 21.33 KG/M2 | WEIGHT: 144 LBS | HEART RATE: 81 BPM | DIASTOLIC BLOOD PRESSURE: 98 MMHG | SYSTOLIC BLOOD PRESSURE: 187 MMHG | TEMPERATURE: 97.5 F

## 2021-07-17 DIAGNOSIS — J34.89 NASAL OBSTRUCTION: Primary | ICD-10-CM

## 2021-07-17 PROCEDURE — 99281 EMR DPT VST MAYX REQ PHY/QHP: CPT

## 2021-07-17 PROCEDURE — 99282 EMERGENCY DEPT VISIT SF MDM: CPT

## 2021-07-17 NOTE — ED PROVIDER NOTES
Mr. Reji Villafana is an 81yo male who presents to the ER with complaints of difficulty breathing out of his right nare. He reports he has had multiple surgeries for skin cancer over the years. He said that he has a persistent right-sided facial droop from her surgery almost 2 years ago. He said that he always has problems breathing out of his right nare since then. He said that he can blow his nose well on that side but cannot breathe out of that side. He said he came into the ER today \"hoping that I can get a tube put in the nose to keep it open. \"  He denies any fevers or chills. No nausea or vomiting. He said that he wiped his left nare and there was a very small amount of blood on the tissue. He has not had any other bloody noses. Denies any other complaints. Past Medical History:   Diagnosis Date    Brain tumor ([address]     frontal; behind right eye.     Cancer ([facility] [address]     [facility] SEVERAL AREAS - MOSTLY HEAD AND FACE    Family history of skin cancer     Hypertension     Other ill-defined conditions(799.89)     elevated cholesterol    Radiation exposure     Sun-damaged skin        Past Surgical History:   Procedure Laterality Date    ABDOMEN SURGERY PROC UNLISTED  1950s, 2009    HERNIA X2    HX COLONOSCOPY  2009    HX GI      inguinal hernia repairx 2    HX HEENT  2009    NASAL RECONSTRUCTION DUE TO [facility]    HX MOHS PROCEDURES  07/09/2018    BCC L chin by Dr. Mariza Echeverria      many skin cancer procedures/nose,ear    [place] REPAIR NASAL CAVITY STENOSIS  10/11/2010    NASAL RECONSTRUCTION performed by Chriss Jones. at [facility] MAIN OR         Family History:   Problem Relation Age of Onset    Heart Failure Mother     Cancer Father         PENILE    Kidney Disease Sister     Cancer Brother         LUNG    No Known Problems Sister     Anesth Problems Neg Hx        Social History     Socioeconomic History    Marital status:      Spouse name: Not on file    Number of children: Not on file    Years of education: Not on file    Highest education level: Not on file   Occupational History    Not on file   Tobacco Use    Smoking status: Former Smoker     Packs/day: 1.00     Years: 4.00     Pack years: 4.00     Quit date: 1960     Years since quittin.5    Smokeless tobacco: Never Used   Substance and Sexual Activity    Alcohol use: No    Drug use: No    Sexual activity: Not on file   Other Topics Concern    Not on file   Social History Narrative    Not on file     Social Determinants of Health     Financial Resource Strain:     Difficulty of Paying Living Expenses:    Food Insecurity:     Worried About Running Out of Food in the Last Year:     [address] in the Last Year:    Transportation Needs:     Lack of Transportation (Medical):  Lack of Transportation (Non-Medical):    Physical Activity:     Days of Exercise per Week:     Minutes of Exercise per Session:    Stress:     Feeling of Stress :    Social Connections:     Frequency of Communication with Friends and Family:     Frequency of Social Gatherings with Friends and Family:     Attends Anabaptist Services:     Active Member of Clubs or Organizations:     Attends Club or Organization Meetings:     Marital Status:    Intimate Partner Violence:     Fear of Current or Ex-Partner:     Emotionally Abused:     Physically Abused:     Sexually Abused: ALLERGIES: Patient has no known allergies. Review of Systems   Constitutional: Negative for chills and fever. HENT: Positive for congestion. Negative for rhinorrhea and sore throat. Respiratory: Negative for cough and shortness of breath. Cardiovascular: Negative for chest pain. Gastrointestinal: Negative for abdominal pain, diarrhea, nausea and vomiting. Genitourinary: Negative for dysuria and hematuria. Musculoskeletal: Negative for arthralgias and myalgias. Skin: Negative for pallor and rash.    Neurological: Negative for dizziness, weakness and light-headedness. All other systems reviewed and are negative. Vitals:    07/17/21 1130   BP: (!) 187/98   Pulse: 81   Resp: 16   Temp: 97.5 °F (36.4 °C)   SpO2: 96%   Weight: 65.3 kg (144 lb)   Height: 5' 9\" (1.753 m)            Physical Exam     Vital signs reviewed. Nursing notes reviewed. Const:  No acute distress, well developed, well nourished  Head:  Atraumatic, normocephalic  Eyes:  PERRL, conjunctiva normal, no scleral icterus  Neck:  Supple, trachea midline  Cardiovascular:  RRR, no murmurs, no gallops, no rubs  Resp:  No resp distress, no increased work of breathing, no wheezes, no rhonchi, no rales,  Abd:  Soft, non-tender, non-distended, no rebound, no guarding, no CVA tenderness  MSK:  No pedal edema, normal ROM  Neuro:  Alert and oriented x3, no cranial nerve defect  Skin:  Warm, dry, intact  Psych: normal mood and affect, behavior is normal, judgement and thought content is normal        MDM  Number of Diagnoses or Management Options     Amount and/or Complexity of Data Reviewed  Review and summarize past medical records: yes    Patient Progress  Patient progress: stable          Mr. Viktoriya Skinner is an 81yo male who presents to the ER with complaints of difficulty breathing out of his right nare as a result of a deformity from his previous surgery. Pt. Is well appearing and is in no distress in the ER and is without acute complaint. I have referred him to ENT. Pt. Agrees to f/u or return to the ER with new or worsening sx.     Procedures

## 2021-07-17 NOTE — ED TRIAGE NOTES
Pt arrives to ED for blocked right nare. Pt has long hx of skin cancer and reconstruction of nose. 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 When presses on right side of face can breathe through nare. Pts nose surgeon is no longer available.

## 2021-07-28 ENCOUNTER — TRANSCRIBE ORDER (OUTPATIENT)
Dept: SCHEDULING | Age: 86
End: 2021-07-28

## 2021-07-28 DIAGNOSIS — D49.0 PAROTID NEOPLASM: Primary | ICD-10-CM

## 2021-07-28 DIAGNOSIS — D03.39 MELANOMA IN SITU OF OTHER PARTS OF FACE (HCC): ICD-10-CM

## 2021-08-03 ENCOUNTER — HOSPITAL ENCOUNTER (OUTPATIENT)
Dept: MRI IMAGING | Age: 86
Discharge: HOME | End: 2021-08-03
Attending: SPECIALIST
Payer: MEDICARE

## 2021-08-03 VITALS — WEIGHT: 145 LBS | BODY MASS INDEX: 21.41 KG/M2

## 2021-08-03 DIAGNOSIS — D49.0 PAROTID NEOPLASM: ICD-10-CM

## 2021-08-03 DIAGNOSIS — D03.39 MELANOMA IN SITU OF OTHER PARTS OF FACE (HCC): ICD-10-CM

## 2021-08-03 PROCEDURE — 70543 MRI ORBT/FAC/NCK W/O &W/DYE: CPT

## 2021-08-03 PROCEDURE — 74011250636 HC RX REV CODE- 250/636: Performed by: RADIOLOGY

## 2021-08-03 PROCEDURE — A9575 INJ GADOTERATE MEGLUMI 0.1ML: HCPCS | Performed by: RADIOLOGY

## 2021-08-03 RX ORDER — GADOTERATE MEGLUMINE 376.9 MG/ML
12 INJECTION INTRAVENOUS
Status: COMPLETED | OUTPATIENT
Start: 2021-08-03 | End: 2021-08-03

## 2021-08-03 RX ADMIN — GADOTERATE MEGLUMINE 12 ML: 376.9 INJECTION INTRAVENOUS at 06:57

## 2022-03-19 PROBLEM — C79.89: Status: ACTIVE | Noted: 2019-04-04

## 2023-05-12 RX ORDER — AMLODIPINE BESYLATE 5 MG/1
5 TABLET ORAL DAILY
COMMUNITY

## 2023-05-12 RX ORDER — LISINOPRIL 40 MG/1
40 TABLET ORAL DAILY
COMMUNITY

## 2023-05-12 RX ORDER — FLUOROURACIL 50 MG/G
CREAM TOPICAL 2 TIMES DAILY
COMMUNITY
Start: 2018-05-30

## 2023-05-12 RX ORDER — LOVASTATIN 20 MG/1
20 TABLET ORAL DAILY
COMMUNITY

## (undated) DEVICE — PROBE 8225101 5PK STD PRASS FL TIP ROHS

## (undated) DEVICE — NEEDLE HYPO 25GA L1.5IN BLU POLYPR HUB S STL REG BVL STR

## (undated) DEVICE — TRAY PREP DRY W/ PREM GLV 2 APPL 6 SPNG 2 UNDPD 1 OVERWRAP

## (undated) DEVICE — SUTURE MCRYL SZ 5-0 L18IN ABSRB UD L13MM P-3 3/8 CIR PRIM Y493G

## (undated) DEVICE — SOLUTION IV 1000ML 0.9% SOD CHL

## (undated) DEVICE — OCCLUSIVE GAUZE STRIP,3% BISMUTH TRIBROMOPHENATE IN PETROLATUM BLEND: Brand: XEROFORM

## (undated) DEVICE — SUTURE PERMAHAND SZ 2-0 L30IN NONABSORBABLE BLK SILK W/O A305H

## (undated) DEVICE — SUT ETHLN 3-0 18IN PS1 BLK --

## (undated) DEVICE — GOWN,NON-REINFORCED,XXL: Brand: MEDLINE

## (undated) DEVICE — HANDLE LT SNAP ON ULT DURABLE LENS FOR TRUMPF ALC DISPOSABLE

## (undated) DEVICE — INSULATED BLADE ELECTRODE: Brand: EDGE

## (undated) DEVICE — INFECTION CONTROL KIT SYS

## (undated) DEVICE — INTENDED FOR TISSUE SEPARATION, AND OTHER PROCEDURES THAT REQUIRE A SHARP SURGICAL BLADE TO PUNCTURE OR CUT.: Brand: BARD-PARKER ® CARBON RIB-BACK BLADES

## (undated) DEVICE — PACK,EENT,TURBAN DRAPE,PK II: Brand: MEDLINE

## (undated) DEVICE — SUT SLK 2-0SH 30IN BLK --

## (undated) DEVICE — 40418 TRENDELENBURG ONE-STEP ARM PROTECTORS LARGE (1 PAIR): Brand: 40418 TRENDELENBURG ONE-STEP ARM PROTECTORS LARGE (1 PAIR)

## (undated) DEVICE — APPLIER CLP AUTO MED 9.75 IN TI SURGCLP SUPER INTLOK 20 DISP

## (undated) DEVICE — X-RAY SPONGES,16 PLY: Brand: DERMACEA

## (undated) DEVICE — TOWEL SURG W17XL27IN STD BLU COT NONFENESTRATED PREWASHED

## (undated) DEVICE — ROCKER SWITCH PENCIL BLADE ELECTRODE, HOLSTER: Brand: EDGE

## (undated) DEVICE — SLIM BODY SKIN STAPLER: Brand: APPOSE ULC

## (undated) DEVICE — TRAY CATH 16FR F FOAM SWAB SGL LAYR TY W/ BARDX IC COMPLT

## (undated) DEVICE — CONTAINER,SPECIMEN,4OZ,OR STRL: Brand: MEDLINE

## (undated) DEVICE — SKIN MARKER,REGULAR TIP WITH RULER AND LABELS: Brand: DEVON

## (undated) DEVICE — DRAIN WND L3 8IN 3 16IN TRCR SIL FULL FLUT FLAT RADPQ CLS

## (undated) DEVICE — SURGICAL PROCEDURE PACK BASIN MAJ SET CUST NO CAUT

## (undated) DEVICE — REM POLYHESIVE ADULT PATIENT RETURN ELECTRODE: Brand: VALLEYLAB

## (undated) DEVICE — SUTURE PERMAHAND SZ 3-0 L30IN NONABSORBABLE BLK SILK BRAID A304H

## (undated) DEVICE — SYR 10ML CTRL LR LCK NSAF LF --

## (undated) DEVICE — ELECTRODE 8227410 PAIRED 2 CH SET ROHS

## (undated) DEVICE — APPLIER CLP LIG SM TI PREM SURGCLP SUPER INTLOK 20 DISP

## (undated) DEVICE — SPONGE: SPECIALTY PEANUT XR 100/CS: Brand: MEDICAL ACTION INDUSTRIES

## (undated) DEVICE — MASTISOL ADHESIVE LIQ 2/3ML

## (undated) DEVICE — Device

## (undated) DEVICE — SUTURE VCRL SZ 3-0 L18IN ABSRB UD L26MM SH 1/2 CIR J864D

## (undated) DEVICE — GARMENT,MEDLINE,DVT,INT,CALF,MED, GEN2: Brand: MEDLINE

## (undated) DEVICE — DEVON™ KNEE AND BODY STRAP 60" X 3" (1.5 M X 7.6 CM): Brand: DEVON

## (undated) DEVICE — AGENT HEMSTAT W2XL14IN OXIDIZED REGENERATED CELOS ABSRB FOR

## (undated) DEVICE — 3M™ TEGADERM™ TRANSPARENT FILM DRESSING FRAME STYLE, 1624W, 2-3/8 IN X 2-3/4 IN (6 CM X 7 CM), 100/CT 4CT/CASE: Brand: 3M™ TEGADERM™

## (undated) DEVICE — BIPOLAR FORCEPS CORD: Brand: VALLEYLAB

## (undated) DEVICE — BLADE ASSEMB CLP HAIR FINE --